# Patient Record
Sex: FEMALE | Race: WHITE | NOT HISPANIC OR LATINO | Employment: OTHER | URBAN - METROPOLITAN AREA
[De-identification: names, ages, dates, MRNs, and addresses within clinical notes are randomized per-mention and may not be internally consistent; named-entity substitution may affect disease eponyms.]

---

## 2017-07-18 ENCOUNTER — APPOINTMENT (OUTPATIENT)
Dept: GENERAL RADIOLOGY | Facility: HOSPITAL | Age: 67
End: 2017-07-18

## 2017-07-18 ENCOUNTER — HOSPITAL ENCOUNTER (EMERGENCY)
Facility: HOSPITAL | Age: 67
Discharge: HOME | End: 2017-07-18
Attending: EMERGENCY MEDICINE | Admitting: EMERGENCY MEDICINE

## 2017-07-18 VITALS
TEMPERATURE: 97.5 F | DIASTOLIC BLOOD PRESSURE: 79 MMHG | BODY MASS INDEX: 38.64 KG/M2 | RESPIRATION RATE: 18 BRPM | HEART RATE: 78 BPM | OXYGEN SATURATION: 93 % | HEIGHT: 62 IN | WEIGHT: 210 LBS | SYSTOLIC BLOOD PRESSURE: 139 MMHG

## 2017-07-18 DIAGNOSIS — S52.691A OTHER CLOSED FRACTURE OF DISTAL END OF RIGHT ULNA, INITIAL ENCOUNTER: ICD-10-CM

## 2017-07-18 DIAGNOSIS — S52.501A CLOSED FRACTURE OF DISTAL END OF RIGHT RADIUS, UNSPECIFIED FRACTURE MORPHOLOGY, INITIAL ENCOUNTER: Primary | ICD-10-CM

## 2017-07-18 PROCEDURE — 99284 EMERGENCY DEPT VISIT MOD MDM: CPT

## 2017-07-18 PROCEDURE — 73090 X-RAY EXAM OF FOREARM: CPT

## 2017-07-18 PROCEDURE — 73100 X-RAY EXAM OF WRIST: CPT

## 2017-07-18 PROCEDURE — 73110 X-RAY EXAM OF WRIST: CPT

## 2017-07-18 RX ORDER — OXYCODONE HYDROCHLORIDE AND ACETAMINOPHEN 5; 325 MG/1; MG/1
1 TABLET ORAL ONCE
Status: COMPLETED | OUTPATIENT
Start: 2017-07-18 | End: 2017-07-18

## 2017-07-18 RX ORDER — BUPIVACAINE HYDROCHLORIDE 5 MG/ML
10 INJECTION, SOLUTION EPIDURAL; INTRACAUDAL ONCE
Status: COMPLETED | OUTPATIENT
Start: 2017-07-18 | End: 2017-07-18

## 2017-07-18 RX ORDER — ONDANSETRON 4 MG/1
4 TABLET, ORALLY DISINTEGRATING ORAL EVERY 8 HOURS PRN
Qty: 15 TABLET | Refills: 0 | Status: SHIPPED | OUTPATIENT
Start: 2017-07-18 | End: 2022-10-24

## 2017-07-18 RX ORDER — OXYCODONE HYDROCHLORIDE AND ACETAMINOPHEN 5; 325 MG/1; MG/1
1 TABLET ORAL EVERY 6 HOURS PRN
Qty: 20 TABLET | Refills: 0 | Status: SHIPPED | OUTPATIENT
Start: 2017-07-18 | End: 2018-05-14

## 2017-07-18 RX ORDER — TIZANIDINE 4 MG/1
4 TABLET ORAL NIGHTLY PRN
COMMUNITY
End: 2018-05-14

## 2017-07-18 RX ORDER — ONDANSETRON 4 MG/1
4 TABLET, ORALLY DISINTEGRATING ORAL ONCE
Status: COMPLETED | OUTPATIENT
Start: 2017-07-18 | End: 2017-07-18

## 2017-07-18 RX ADMIN — ONDANSETRON 4 MG: 4 TABLET, ORALLY DISINTEGRATING ORAL at 21:01

## 2017-07-18 RX ADMIN — BUPIVACAINE HYDROCHLORIDE 10 ML: 5 INJECTION, SOLUTION EPIDURAL; INTRACAUDAL; PERINEURAL at 20:48

## 2017-07-18 RX ADMIN — OXYCODONE HYDROCHLORIDE AND ACETAMINOPHEN 1 TABLET: 5; 325 TABLET ORAL at 21:01

## 2018-05-07 ENCOUNTER — TELEPHONE (OUTPATIENT)
Dept: FAMILY MEDICINE CLINIC | Facility: CLINIC | Age: 68
End: 2018-05-07

## 2018-05-14 ENCOUNTER — OFFICE VISIT (OUTPATIENT)
Dept: FAMILY MEDICINE CLINIC | Facility: CLINIC | Age: 68
End: 2018-05-14

## 2018-05-14 VITALS
SYSTOLIC BLOOD PRESSURE: 122 MMHG | WEIGHT: 225 LBS | OXYGEN SATURATION: 99 % | BODY MASS INDEX: 41.41 KG/M2 | DIASTOLIC BLOOD PRESSURE: 84 MMHG | HEIGHT: 62 IN | HEART RATE: 90 BPM | TEMPERATURE: 98.2 F

## 2018-05-14 DIAGNOSIS — E03.8 SUBCLINICAL HYPOTHYROIDISM: Primary | ICD-10-CM

## 2018-05-14 DIAGNOSIS — E78.2 MIXED HYPERLIPIDEMIA: ICD-10-CM

## 2018-05-14 PROCEDURE — 99213 OFFICE O/P EST LOW 20 MIN: CPT | Performed by: INTERNAL MEDICINE

## 2018-05-14 RX ORDER — PREDNISONE 1 MG/1
1 TABLET ORAL DAILY
COMMUNITY

## 2018-07-13 ENCOUNTER — RESULTS ENCOUNTER (OUTPATIENT)
Dept: FAMILY MEDICINE CLINIC | Facility: CLINIC | Age: 68
End: 2018-07-13

## 2018-07-13 DIAGNOSIS — E78.2 MIXED HYPERLIPIDEMIA: ICD-10-CM

## 2018-07-13 DIAGNOSIS — E03.8 SUBCLINICAL HYPOTHYROIDISM: ICD-10-CM

## 2018-07-18 LAB
CHOLEST SERPL-MCNC: 165 MG/DL (ref 100–199)
HDLC SERPL-MCNC: 61 MG/DL
LDLC SERPL CALC-MCNC: 83 MG/DL (ref 0–99)
T3FREE SERPL-MCNC: 4 PG/ML (ref 2–4.4)
T4 FREE SERPL-MCNC: 1.55 NG/DL (ref 0.82–1.77)
TRIGL SERPL-MCNC: 107 MG/DL (ref 0–149)
TSH SERPL DL<=0.005 MIU/L-ACNC: 4.7 UIU/ML (ref 0.45–4.5)
VLDLC SERPL CALC-MCNC: 21 MG/DL (ref 5–40)

## 2018-10-18 ENCOUNTER — OFFICE VISIT (OUTPATIENT)
Dept: FAMILY MEDICINE CLINIC | Facility: CLINIC | Age: 68
End: 2018-10-18

## 2018-10-18 VITALS
SYSTOLIC BLOOD PRESSURE: 124 MMHG | WEIGHT: 223 LBS | TEMPERATURE: 97.9 F | HEART RATE: 76 BPM | DIASTOLIC BLOOD PRESSURE: 80 MMHG | BODY MASS INDEX: 41.04 KG/M2 | RESPIRATION RATE: 15 BRPM | HEIGHT: 62 IN

## 2018-10-18 DIAGNOSIS — I89.0 LYMPHEDEMA: Primary | ICD-10-CM

## 2018-10-18 DIAGNOSIS — E03.8 SUBCLINICAL HYPOTHYROIDISM: ICD-10-CM

## 2018-10-18 PROBLEM — G40.309 NONINTRACTABLE GENERALIZED IDIOPATHIC EPILEPSY WITHOUT STATUS EPILEPTICUS (HCC): Status: ACTIVE | Noted: 2018-10-18

## 2018-10-18 PROCEDURE — 99213 OFFICE O/P EST LOW 20 MIN: CPT | Performed by: INTERNAL MEDICINE

## 2018-10-18 RX ORDER — CLONAZEPAM 0.5 MG/1
.25-.5 TABLET ORAL
COMMUNITY
Start: 2018-10-04

## 2018-10-19 DIAGNOSIS — M06.9 RHEUMATOID ARTHRITIS INVOLVING MULTIPLE SITES, UNSPECIFIED RHEUMATOID FACTOR PRESENCE: ICD-10-CM

## 2018-10-19 DIAGNOSIS — M19.90 INFLAMMATORY ARTHROPATHY: ICD-10-CM

## 2018-10-19 DIAGNOSIS — M05.849: Primary | ICD-10-CM

## 2018-10-19 LAB
T3FREE SERPL-MCNC: 3.6 PG/ML (ref 2–4.4)
T4 FREE SERPL-MCNC: 1.62 NG/DL (ref 0.93–1.7)
TSH SERPL DL<=0.005 MIU/L-ACNC: 3.43 MIU/ML (ref 0.27–4.2)

## 2018-11-14 ENCOUNTER — HOSPITAL ENCOUNTER (OUTPATIENT)
Dept: PHYSICAL THERAPY | Facility: HOSPITAL | Age: 68
Setting detail: THERAPIES SERIES
Discharge: HOME | End: 2018-11-14

## 2018-11-14 DIAGNOSIS — I89.0 LYMPHEDEMA: Primary | ICD-10-CM

## 2018-11-14 PROCEDURE — G8978 MOBILITY CURRENT STATUS: HCPCS

## 2018-11-14 PROCEDURE — 97162 PT EVAL MOD COMPLEX 30 MIN: CPT

## 2018-11-14 PROCEDURE — G8979 MOBILITY GOAL STATUS: HCPCS

## 2018-12-17 ENCOUNTER — HOSPITAL ENCOUNTER (OUTPATIENT)
Dept: PHYSICAL THERAPY | Facility: HOSPITAL | Age: 68
Setting detail: THERAPIES SERIES
Discharge: HOME | End: 2018-12-17

## 2018-12-17 DIAGNOSIS — I89.0 LYMPHEDEMA: Primary | ICD-10-CM

## 2018-12-17 PROCEDURE — 97140 MANUAL THERAPY 1/> REGIONS: CPT

## 2018-12-18 ENCOUNTER — HOSPITAL ENCOUNTER (OUTPATIENT)
Dept: PHYSICAL THERAPY | Facility: HOSPITAL | Age: 68
Setting detail: THERAPIES SERIES
Discharge: HOME | End: 2018-12-18

## 2018-12-18 DIAGNOSIS — I89.0 LYMPHEDEMA: Primary | ICD-10-CM

## 2018-12-18 PROCEDURE — 97140 MANUAL THERAPY 1/> REGIONS: CPT

## 2018-12-19 ENCOUNTER — HOSPITAL ENCOUNTER (OUTPATIENT)
Dept: PHYSICAL THERAPY | Facility: HOSPITAL | Age: 68
Setting detail: THERAPIES SERIES
Discharge: HOME | End: 2018-12-19

## 2018-12-19 DIAGNOSIS — I89.0 LYMPHEDEMA: Primary | ICD-10-CM

## 2018-12-19 PROCEDURE — 97140 MANUAL THERAPY 1/> REGIONS: CPT

## 2018-12-20 ENCOUNTER — HOSPITAL ENCOUNTER (OUTPATIENT)
Dept: PHYSICAL THERAPY | Facility: HOSPITAL | Age: 68
Setting detail: THERAPIES SERIES
Discharge: HOME | End: 2018-12-20

## 2018-12-20 DIAGNOSIS — I89.0 LYMPHEDEMA: Primary | ICD-10-CM

## 2018-12-20 PROCEDURE — 97140 MANUAL THERAPY 1/> REGIONS: CPT

## 2018-12-21 ENCOUNTER — HOSPITAL ENCOUNTER (OUTPATIENT)
Dept: PHYSICAL THERAPY | Facility: HOSPITAL | Age: 68
Setting detail: THERAPIES SERIES
Discharge: HOME | End: 2018-12-21

## 2018-12-21 DIAGNOSIS — I89.0 LYMPHEDEMA: Primary | ICD-10-CM

## 2018-12-21 PROCEDURE — 97140 MANUAL THERAPY 1/> REGIONS: CPT

## 2018-12-24 ENCOUNTER — HOSPITAL ENCOUNTER (OUTPATIENT)
Dept: PHYSICAL THERAPY | Facility: HOSPITAL | Age: 68
Setting detail: THERAPIES SERIES
Discharge: HOME | End: 2018-12-24

## 2018-12-24 DIAGNOSIS — I89.0 LYMPHEDEMA: Primary | ICD-10-CM

## 2018-12-24 PROCEDURE — 97140 MANUAL THERAPY 1/> REGIONS: CPT

## 2018-12-27 ENCOUNTER — HOSPITAL ENCOUNTER (OUTPATIENT)
Dept: PHYSICAL THERAPY | Facility: HOSPITAL | Age: 68
Setting detail: THERAPIES SERIES
Discharge: HOME | End: 2018-12-27

## 2018-12-27 DIAGNOSIS — I89.0 LYMPHEDEMA: Primary | ICD-10-CM

## 2018-12-27 PROCEDURE — 97140 MANUAL THERAPY 1/> REGIONS: CPT

## 2018-12-28 ENCOUNTER — HOSPITAL ENCOUNTER (OUTPATIENT)
Dept: PHYSICAL THERAPY | Facility: HOSPITAL | Age: 68
Setting detail: THERAPIES SERIES
Discharge: HOME | End: 2018-12-28

## 2018-12-28 DIAGNOSIS — I89.0 LYMPHEDEMA: Primary | ICD-10-CM

## 2018-12-28 PROCEDURE — 97140 MANUAL THERAPY 1/> REGIONS: CPT

## 2018-12-31 ENCOUNTER — HOSPITAL ENCOUNTER (OUTPATIENT)
Dept: PHYSICAL THERAPY | Facility: HOSPITAL | Age: 68
Setting detail: THERAPIES SERIES
Discharge: HOME | End: 2018-12-31

## 2018-12-31 DIAGNOSIS — I89.0 LYMPHEDEMA: Primary | ICD-10-CM

## 2018-12-31 PROCEDURE — G8978 MOBILITY CURRENT STATUS: HCPCS

## 2018-12-31 PROCEDURE — G8979 MOBILITY GOAL STATUS: HCPCS

## 2018-12-31 PROCEDURE — 97140 MANUAL THERAPY 1/> REGIONS: CPT

## 2019-01-02 ENCOUNTER — HOSPITAL ENCOUNTER (OUTPATIENT)
Dept: PHYSICAL THERAPY | Facility: HOSPITAL | Age: 69
Setting detail: THERAPIES SERIES
Discharge: HOME | End: 2019-01-02

## 2019-01-02 DIAGNOSIS — I89.0 LYMPHEDEMA: Primary | ICD-10-CM

## 2019-01-02 PROCEDURE — 97140 MANUAL THERAPY 1/> REGIONS: CPT

## 2019-01-03 ENCOUNTER — APPOINTMENT (OUTPATIENT)
Dept: PHYSICAL THERAPY | Facility: HOSPITAL | Age: 69
End: 2019-01-03

## 2019-01-04 ENCOUNTER — HOSPITAL ENCOUNTER (OUTPATIENT)
Dept: PHYSICAL THERAPY | Facility: HOSPITAL | Age: 69
Setting detail: THERAPIES SERIES
Discharge: HOME | End: 2019-01-04

## 2019-01-04 DIAGNOSIS — I89.0 LYMPHEDEMA: Primary | ICD-10-CM

## 2019-01-04 PROCEDURE — 97140 MANUAL THERAPY 1/> REGIONS: CPT

## 2019-01-07 ENCOUNTER — HOSPITAL ENCOUNTER (OUTPATIENT)
Dept: PHYSICAL THERAPY | Facility: HOSPITAL | Age: 69
Setting detail: THERAPIES SERIES
Discharge: HOME | End: 2019-01-07

## 2019-01-07 DIAGNOSIS — I89.0 LYMPHEDEMA: Primary | ICD-10-CM

## 2019-01-07 PROCEDURE — 97140 MANUAL THERAPY 1/> REGIONS: CPT

## 2019-01-08 ENCOUNTER — HOSPITAL ENCOUNTER (OUTPATIENT)
Dept: PHYSICAL THERAPY | Facility: HOSPITAL | Age: 69
Setting detail: THERAPIES SERIES
Discharge: HOME | End: 2019-01-08

## 2019-01-08 DIAGNOSIS — I89.0 LYMPHEDEMA: Primary | ICD-10-CM

## 2019-01-08 PROCEDURE — 97140 MANUAL THERAPY 1/> REGIONS: CPT

## 2019-01-09 ENCOUNTER — HOSPITAL ENCOUNTER (OUTPATIENT)
Dept: PHYSICAL THERAPY | Facility: HOSPITAL | Age: 69
Setting detail: THERAPIES SERIES
Discharge: HOME | End: 2019-01-09

## 2019-01-09 DIAGNOSIS — I89.0 LYMPHEDEMA: Primary | ICD-10-CM

## 2019-01-09 PROCEDURE — 97140 MANUAL THERAPY 1/> REGIONS: CPT

## 2019-01-10 ENCOUNTER — APPOINTMENT (OUTPATIENT)
Dept: PHYSICAL THERAPY | Facility: HOSPITAL | Age: 69
End: 2019-01-10

## 2019-01-11 ENCOUNTER — HOSPITAL ENCOUNTER (OUTPATIENT)
Dept: PHYSICAL THERAPY | Facility: HOSPITAL | Age: 69
Setting detail: THERAPIES SERIES
Discharge: HOME | End: 2019-01-11

## 2019-01-11 DIAGNOSIS — I89.0 LYMPHEDEMA: Primary | ICD-10-CM

## 2019-01-11 PROCEDURE — 97140 MANUAL THERAPY 1/> REGIONS: CPT

## 2019-01-14 ENCOUNTER — HOSPITAL ENCOUNTER (OUTPATIENT)
Dept: PHYSICAL THERAPY | Facility: HOSPITAL | Age: 69
Setting detail: THERAPIES SERIES
Discharge: HOME | End: 2019-01-14

## 2019-01-14 DIAGNOSIS — I89.0 LYMPHEDEMA: Primary | ICD-10-CM

## 2019-01-14 PROCEDURE — 97140 MANUAL THERAPY 1/> REGIONS: CPT

## 2019-01-15 ENCOUNTER — HOSPITAL ENCOUNTER (OUTPATIENT)
Dept: PHYSICAL THERAPY | Facility: HOSPITAL | Age: 69
Setting detail: THERAPIES SERIES
Discharge: HOME | End: 2019-01-15

## 2019-01-15 DIAGNOSIS — I89.0 LYMPHEDEMA: Primary | ICD-10-CM

## 2019-01-15 PROCEDURE — 97140 MANUAL THERAPY 1/> REGIONS: CPT

## 2019-01-16 ENCOUNTER — HOSPITAL ENCOUNTER (OUTPATIENT)
Dept: PHYSICAL THERAPY | Facility: HOSPITAL | Age: 69
Setting detail: THERAPIES SERIES
Discharge: HOME | End: 2019-01-16

## 2019-01-16 DIAGNOSIS — I89.0 LYMPHEDEMA: Primary | ICD-10-CM

## 2019-01-16 PROCEDURE — 97140 MANUAL THERAPY 1/> REGIONS: CPT

## 2019-01-17 ENCOUNTER — HOSPITAL ENCOUNTER (OUTPATIENT)
Dept: PHYSICAL THERAPY | Facility: HOSPITAL | Age: 69
Setting detail: THERAPIES SERIES
Discharge: HOME | End: 2019-01-17

## 2019-01-17 DIAGNOSIS — I89.0 LYMPHEDEMA: Primary | ICD-10-CM

## 2019-01-17 PROCEDURE — 97140 MANUAL THERAPY 1/> REGIONS: CPT

## 2019-01-18 ENCOUNTER — HOSPITAL ENCOUNTER (OUTPATIENT)
Dept: PHYSICAL THERAPY | Facility: HOSPITAL | Age: 69
Setting detail: THERAPIES SERIES
Discharge: HOME | End: 2019-01-18

## 2019-01-18 DIAGNOSIS — I89.0 LYMPHEDEMA: Primary | ICD-10-CM

## 2019-01-18 PROCEDURE — 97140 MANUAL THERAPY 1/> REGIONS: CPT

## 2019-01-21 ENCOUNTER — HOSPITAL ENCOUNTER (OUTPATIENT)
Dept: PHYSICAL THERAPY | Facility: HOSPITAL | Age: 69
Setting detail: THERAPIES SERIES
Discharge: HOME | End: 2019-01-21

## 2019-01-21 DIAGNOSIS — I89.0 LYMPHEDEMA: Primary | ICD-10-CM

## 2019-01-21 PROCEDURE — 97140 MANUAL THERAPY 1/> REGIONS: CPT

## 2019-01-22 ENCOUNTER — APPOINTMENT (OUTPATIENT)
Dept: PHYSICAL THERAPY | Facility: HOSPITAL | Age: 69
End: 2019-01-22

## 2019-01-23 ENCOUNTER — APPOINTMENT (OUTPATIENT)
Dept: PHYSICAL THERAPY | Facility: HOSPITAL | Age: 69
End: 2019-01-23

## 2019-01-24 ENCOUNTER — HOSPITAL ENCOUNTER (OUTPATIENT)
Dept: PHYSICAL THERAPY | Facility: HOSPITAL | Age: 69
Setting detail: THERAPIES SERIES
Discharge: HOME | End: 2019-01-24

## 2019-01-24 ENCOUNTER — APPOINTMENT (OUTPATIENT)
Dept: PHYSICAL THERAPY | Facility: HOSPITAL | Age: 69
End: 2019-01-24

## 2019-01-24 DIAGNOSIS — I89.0 LYMPHEDEMA: Primary | ICD-10-CM

## 2019-01-24 PROCEDURE — 97535 SELF CARE MNGMENT TRAINING: CPT

## 2019-01-24 PROCEDURE — 97140 MANUAL THERAPY 1/> REGIONS: CPT

## 2019-01-25 ENCOUNTER — HOSPITAL ENCOUNTER (OUTPATIENT)
Dept: PHYSICAL THERAPY | Facility: HOSPITAL | Age: 69
Setting detail: THERAPIES SERIES
Discharge: HOME | End: 2019-01-25

## 2019-01-25 DIAGNOSIS — I89.0 LYMPHEDEMA: Primary | ICD-10-CM

## 2019-01-25 PROCEDURE — 97535 SELF CARE MNGMENT TRAINING: CPT

## 2019-04-17 ENCOUNTER — OFFICE VISIT (OUTPATIENT)
Dept: FAMILY MEDICINE CLINIC | Facility: CLINIC | Age: 69
End: 2019-04-17

## 2019-04-17 VITALS
DIASTOLIC BLOOD PRESSURE: 78 MMHG | OXYGEN SATURATION: 98 % | SYSTOLIC BLOOD PRESSURE: 118 MMHG | HEART RATE: 79 BPM | HEIGHT: 62 IN | RESPIRATION RATE: 16 BRPM | BODY MASS INDEX: 40.3 KG/M2 | WEIGHT: 219 LBS | TEMPERATURE: 97.5 F

## 2019-04-17 DIAGNOSIS — M54.50 ACUTE MIDLINE LOW BACK PAIN WITHOUT SCIATICA: Primary | ICD-10-CM

## 2019-04-17 DIAGNOSIS — R29.898 LEG WEAKNESS, BILATERAL: ICD-10-CM

## 2019-04-17 DIAGNOSIS — R26.89 IMBALANCE: ICD-10-CM

## 2019-04-17 PROCEDURE — 99214 OFFICE O/P EST MOD 30 MIN: CPT | Performed by: INTERNAL MEDICINE

## 2019-04-17 RX ORDER — TRIAMCINOLONE ACETONIDE 1 MG/G
CREAM TOPICAL
COMMUNITY
End: 2019-12-19

## 2019-04-17 RX ORDER — CHOLECALCIFEROL (VITAMIN D3) 125 MCG
CAPSULE ORAL
COMMUNITY

## 2019-04-23 ENCOUNTER — TREATMENT (OUTPATIENT)
Dept: PHYSICAL THERAPY | Facility: CLINIC | Age: 69
End: 2019-04-23

## 2019-04-23 DIAGNOSIS — R53.1 DECREASED STRENGTH, ENDURANCE, AND MOBILITY: ICD-10-CM

## 2019-04-23 DIAGNOSIS — G89.29 CHRONIC MIDLINE LOW BACK PAIN WITHOUT SCIATICA: Primary | ICD-10-CM

## 2019-04-23 DIAGNOSIS — R29.898 LEG WEAKNESS, BILATERAL: ICD-10-CM

## 2019-04-23 DIAGNOSIS — M54.50 ACUTE MIDLINE LOW BACK PAIN WITHOUT SCIATICA: ICD-10-CM

## 2019-04-23 DIAGNOSIS — M54.50 CHRONIC MIDLINE LOW BACK PAIN WITHOUT SCIATICA: Primary | ICD-10-CM

## 2019-04-23 DIAGNOSIS — R26.2 DIFFICULTY WALKING: ICD-10-CM

## 2019-04-23 DIAGNOSIS — R68.89 DECREASED STRENGTH, ENDURANCE, AND MOBILITY: ICD-10-CM

## 2019-04-23 DIAGNOSIS — R26.81 DIFFICULTY STANDING: ICD-10-CM

## 2019-04-23 DIAGNOSIS — Z74.09 DECREASED STRENGTH, ENDURANCE, AND MOBILITY: ICD-10-CM

## 2019-04-23 PROCEDURE — 97161 PT EVAL LOW COMPLEX 20 MIN: CPT | Performed by: PHYSICAL THERAPIST

## 2019-04-23 PROCEDURE — 97530 THERAPEUTIC ACTIVITIES: CPT | Performed by: PHYSICAL THERAPIST

## 2019-04-23 PROCEDURE — 97110 THERAPEUTIC EXERCISES: CPT | Performed by: PHYSICAL THERAPIST

## 2019-04-30 ENCOUNTER — TREATMENT (OUTPATIENT)
Dept: PHYSICAL THERAPY | Facility: CLINIC | Age: 69
End: 2019-04-30

## 2019-04-30 DIAGNOSIS — R68.89 DECREASED STRENGTH, ENDURANCE, AND MOBILITY: ICD-10-CM

## 2019-04-30 DIAGNOSIS — R29.898 LEG WEAKNESS, BILATERAL: ICD-10-CM

## 2019-04-30 DIAGNOSIS — M54.50 CHRONIC MIDLINE LOW BACK PAIN WITHOUT SCIATICA: Primary | ICD-10-CM

## 2019-04-30 DIAGNOSIS — R26.81 DIFFICULTY STANDING: ICD-10-CM

## 2019-04-30 DIAGNOSIS — R26.2 DIFFICULTY WALKING: ICD-10-CM

## 2019-04-30 DIAGNOSIS — Z74.09 DECREASED STRENGTH, ENDURANCE, AND MOBILITY: ICD-10-CM

## 2019-04-30 DIAGNOSIS — G89.29 CHRONIC MIDLINE LOW BACK PAIN WITHOUT SCIATICA: Primary | ICD-10-CM

## 2019-04-30 DIAGNOSIS — R53.1 DECREASED STRENGTH, ENDURANCE, AND MOBILITY: ICD-10-CM

## 2019-04-30 PROCEDURE — 97110 THERAPEUTIC EXERCISES: CPT | Performed by: PHYSICAL THERAPIST

## 2019-05-02 ENCOUNTER — TREATMENT (OUTPATIENT)
Dept: PHYSICAL THERAPY | Facility: CLINIC | Age: 69
End: 2019-05-02

## 2019-05-02 DIAGNOSIS — R68.89 DECREASED STRENGTH, ENDURANCE, AND MOBILITY: ICD-10-CM

## 2019-05-02 DIAGNOSIS — R53.1 DECREASED STRENGTH, ENDURANCE, AND MOBILITY: ICD-10-CM

## 2019-05-02 DIAGNOSIS — R26.2 DIFFICULTY WALKING: ICD-10-CM

## 2019-05-02 DIAGNOSIS — R29.898 LEG WEAKNESS, BILATERAL: ICD-10-CM

## 2019-05-02 DIAGNOSIS — G89.29 CHRONIC MIDLINE LOW BACK PAIN WITHOUT SCIATICA: Primary | ICD-10-CM

## 2019-05-02 DIAGNOSIS — M54.50 CHRONIC MIDLINE LOW BACK PAIN WITHOUT SCIATICA: Primary | ICD-10-CM

## 2019-05-02 DIAGNOSIS — R26.81 DIFFICULTY STANDING: ICD-10-CM

## 2019-05-02 DIAGNOSIS — Z74.09 DECREASED STRENGTH, ENDURANCE, AND MOBILITY: ICD-10-CM

## 2019-05-02 PROCEDURE — 97110 THERAPEUTIC EXERCISES: CPT | Performed by: PHYSICAL THERAPIST

## 2019-05-02 PROCEDURE — 97112 NEUROMUSCULAR REEDUCATION: CPT | Performed by: PHYSICAL THERAPIST

## 2019-05-06 ENCOUNTER — HOSPITAL ENCOUNTER (OUTPATIENT)
Dept: MRI IMAGING | Facility: HOSPITAL | Age: 69
Discharge: HOME | End: 2019-05-06
Admitting: INTERNAL MEDICINE

## 2019-05-06 DIAGNOSIS — R26.89 IMBALANCE: ICD-10-CM

## 2019-05-06 DIAGNOSIS — M54.50 ACUTE MIDLINE LOW BACK PAIN WITHOUT SCIATICA: ICD-10-CM

## 2019-05-06 PROCEDURE — 72148 MRI LUMBAR SPINE W/O DYE: CPT

## 2019-05-07 ENCOUNTER — TREATMENT (OUTPATIENT)
Dept: PHYSICAL THERAPY | Facility: CLINIC | Age: 69
End: 2019-05-07

## 2019-05-07 DIAGNOSIS — R53.1 DECREASED STRENGTH, ENDURANCE, AND MOBILITY: ICD-10-CM

## 2019-05-07 DIAGNOSIS — R29.898 LEG WEAKNESS, BILATERAL: ICD-10-CM

## 2019-05-07 DIAGNOSIS — Z74.09 DECREASED STRENGTH, ENDURANCE, AND MOBILITY: ICD-10-CM

## 2019-05-07 DIAGNOSIS — R26.2 DIFFICULTY WALKING: ICD-10-CM

## 2019-05-07 DIAGNOSIS — G89.29 CHRONIC MIDLINE LOW BACK PAIN WITHOUT SCIATICA: Primary | ICD-10-CM

## 2019-05-07 DIAGNOSIS — R68.89 DECREASED STRENGTH, ENDURANCE, AND MOBILITY: ICD-10-CM

## 2019-05-07 DIAGNOSIS — R26.81 DIFFICULTY STANDING: ICD-10-CM

## 2019-05-07 DIAGNOSIS — M54.50 CHRONIC MIDLINE LOW BACK PAIN WITHOUT SCIATICA: Primary | ICD-10-CM

## 2019-05-07 PROCEDURE — 97110 THERAPEUTIC EXERCISES: CPT | Performed by: PHYSICAL THERAPIST

## 2019-05-07 PROCEDURE — 97112 NEUROMUSCULAR REEDUCATION: CPT | Performed by: PHYSICAL THERAPIST

## 2019-05-09 ENCOUNTER — TREATMENT (OUTPATIENT)
Dept: PHYSICAL THERAPY | Facility: CLINIC | Age: 69
End: 2019-05-09

## 2019-05-09 DIAGNOSIS — R93.7 ABNORMAL MRI, LUMBAR SPINE: Primary | ICD-10-CM

## 2019-05-09 DIAGNOSIS — R53.1 DECREASED STRENGTH, ENDURANCE, AND MOBILITY: ICD-10-CM

## 2019-05-09 DIAGNOSIS — R26.81 DIFFICULTY STANDING: ICD-10-CM

## 2019-05-09 DIAGNOSIS — M54.50 CHRONIC MIDLINE LOW BACK PAIN WITHOUT SCIATICA: Primary | ICD-10-CM

## 2019-05-09 DIAGNOSIS — M54.50 CHRONIC LOW BACK PAIN WITHOUT SCIATICA, UNSPECIFIED BACK PAIN LATERALITY: ICD-10-CM

## 2019-05-09 DIAGNOSIS — R26.2 DIFFICULTY WALKING: ICD-10-CM

## 2019-05-09 DIAGNOSIS — R68.89 DECREASED STRENGTH, ENDURANCE, AND MOBILITY: ICD-10-CM

## 2019-05-09 DIAGNOSIS — R29.898 BILATERAL LEG WEAKNESS: ICD-10-CM

## 2019-05-09 DIAGNOSIS — R29.898 LEG WEAKNESS, BILATERAL: ICD-10-CM

## 2019-05-09 DIAGNOSIS — Z74.09 DECREASED STRENGTH, ENDURANCE, AND MOBILITY: ICD-10-CM

## 2019-05-09 DIAGNOSIS — G89.29 CHRONIC LOW BACK PAIN WITHOUT SCIATICA, UNSPECIFIED BACK PAIN LATERALITY: ICD-10-CM

## 2019-05-09 DIAGNOSIS — G89.29 CHRONIC MIDLINE LOW BACK PAIN WITHOUT SCIATICA: Primary | ICD-10-CM

## 2019-05-09 PROCEDURE — 97110 THERAPEUTIC EXERCISES: CPT | Performed by: PHYSICAL THERAPIST

## 2019-05-09 PROCEDURE — 97112 NEUROMUSCULAR REEDUCATION: CPT | Performed by: PHYSICAL THERAPIST

## 2019-05-30 ENCOUNTER — DOCUMENTATION (OUTPATIENT)
Dept: PHYSICAL THERAPY | Facility: HOSPITAL | Age: 69
End: 2019-05-30

## 2019-05-30 DIAGNOSIS — I89.0 LYMPHEDEMA: Primary | ICD-10-CM

## 2019-05-31 ENCOUNTER — OFFICE VISIT (OUTPATIENT)
Dept: NEUROSURGERY | Facility: CLINIC | Age: 69
End: 2019-05-31

## 2019-05-31 ENCOUNTER — TELEPHONE (OUTPATIENT)
Dept: FAMILY MEDICINE CLINIC | Facility: CLINIC | Age: 69
End: 2019-05-31

## 2019-05-31 VITALS
BODY MASS INDEX: 41.96 KG/M2 | WEIGHT: 228 LBS | RESPIRATION RATE: 16 BRPM | SYSTOLIC BLOOD PRESSURE: 134 MMHG | DIASTOLIC BLOOD PRESSURE: 72 MMHG | HEART RATE: 80 BPM | HEIGHT: 62 IN

## 2019-05-31 DIAGNOSIS — G89.29 CHRONIC BILATERAL LOW BACK PAIN WITHOUT SCIATICA: Primary | ICD-10-CM

## 2019-05-31 DIAGNOSIS — M54.50 CHRONIC BILATERAL LOW BACK PAIN WITHOUT SCIATICA: Primary | ICD-10-CM

## 2019-05-31 DIAGNOSIS — M81.0 OSTEOPOROSIS, UNSPECIFIED OSTEOPOROSIS TYPE, UNSPECIFIED PATHOLOGICAL FRACTURE PRESENCE: ICD-10-CM

## 2019-05-31 DIAGNOSIS — E66.01 MORBID OBESITY WITH BMI OF 40.0-44.9, ADULT (HCC): ICD-10-CM

## 2019-05-31 DIAGNOSIS — G40.309 NONINTRACTABLE GENERALIZED IDIOPATHIC EPILEPSY WITHOUT STATUS EPILEPTICUS (HCC): ICD-10-CM

## 2019-05-31 DIAGNOSIS — M51.9 LUMBAR DISC DISEASE: ICD-10-CM

## 2019-05-31 PROCEDURE — 99203 OFFICE O/P NEW LOW 30 MIN: CPT | Performed by: NEUROLOGICAL SURGERY

## 2019-05-31 RX ORDER — LAMOTRIGINE 100 MG/1
100 TABLET ORAL DAILY
COMMUNITY
End: 2022-10-21

## 2019-05-31 RX ORDER — OXYCODONE HYDROCHLORIDE AND ACETAMINOPHEN 5; 325 MG/1; MG/1
1 TABLET ORAL EVERY 6 HOURS PRN
COMMUNITY
End: 2019-12-19

## 2019-07-01 ENCOUNTER — DOCUMENTATION (OUTPATIENT)
Dept: PHYSICAL THERAPY | Facility: CLINIC | Age: 69
End: 2019-07-01

## 2019-07-25 ENCOUNTER — TRANSCRIBE ORDERS (OUTPATIENT)
Dept: PHYSICAL THERAPY | Facility: CLINIC | Age: 69
End: 2019-07-25

## 2019-07-25 ENCOUNTER — TREATMENT (OUTPATIENT)
Dept: PHYSICAL THERAPY | Facility: CLINIC | Age: 69
End: 2019-07-25

## 2019-07-25 DIAGNOSIS — R27.0 ATAXIA: ICD-10-CM

## 2019-07-25 DIAGNOSIS — R29.6 FREQUENT FALLS: ICD-10-CM

## 2019-07-25 DIAGNOSIS — R27.0 ATAXIA: Primary | ICD-10-CM

## 2019-07-25 DIAGNOSIS — R26.2 DIFFICULTY WALKING: Primary | ICD-10-CM

## 2019-07-25 DIAGNOSIS — Z74.09 DECREASED STRENGTH, ENDURANCE, AND MOBILITY: ICD-10-CM

## 2019-07-25 DIAGNOSIS — R29.898 LEG WEAKNESS, BILATERAL: ICD-10-CM

## 2019-07-25 DIAGNOSIS — R68.89 DECREASED STRENGTH, ENDURANCE, AND MOBILITY: ICD-10-CM

## 2019-07-25 DIAGNOSIS — R53.1 DECREASED STRENGTH, ENDURANCE, AND MOBILITY: ICD-10-CM

## 2019-07-25 DIAGNOSIS — R26.81 DIFFICULTY STANDING: ICD-10-CM

## 2019-07-25 PROCEDURE — 97110 THERAPEUTIC EXERCISES: CPT | Performed by: PHYSICAL THERAPIST

## 2019-07-25 PROCEDURE — 97162 PT EVAL MOD COMPLEX 30 MIN: CPT | Performed by: PHYSICAL THERAPIST

## 2019-07-25 PROCEDURE — 97112 NEUROMUSCULAR REEDUCATION: CPT | Performed by: PHYSICAL THERAPIST

## 2019-07-30 ENCOUNTER — TREATMENT (OUTPATIENT)
Dept: PHYSICAL THERAPY | Facility: CLINIC | Age: 69
End: 2019-07-30

## 2019-07-30 DIAGNOSIS — R26.81 DIFFICULTY STANDING: ICD-10-CM

## 2019-07-30 DIAGNOSIS — R53.1 DECREASED STRENGTH, ENDURANCE, AND MOBILITY: ICD-10-CM

## 2019-07-30 DIAGNOSIS — R26.2 DIFFICULTY WALKING: Primary | ICD-10-CM

## 2019-07-30 DIAGNOSIS — Z74.09 DECREASED STRENGTH, ENDURANCE, AND MOBILITY: ICD-10-CM

## 2019-07-30 DIAGNOSIS — R29.898 LEG WEAKNESS, BILATERAL: ICD-10-CM

## 2019-07-30 DIAGNOSIS — R68.89 DECREASED STRENGTH, ENDURANCE, AND MOBILITY: ICD-10-CM

## 2019-07-30 DIAGNOSIS — R27.0 ATAXIA: ICD-10-CM

## 2019-07-30 PROCEDURE — 97110 THERAPEUTIC EXERCISES: CPT | Performed by: PHYSICAL THERAPIST

## 2019-08-01 ENCOUNTER — TREATMENT (OUTPATIENT)
Dept: PHYSICAL THERAPY | Facility: CLINIC | Age: 69
End: 2019-08-01

## 2019-08-01 DIAGNOSIS — Z74.09 DECREASED STRENGTH, ENDURANCE, AND MOBILITY: ICD-10-CM

## 2019-08-01 DIAGNOSIS — R26.2 DIFFICULTY WALKING: Primary | ICD-10-CM

## 2019-08-01 DIAGNOSIS — R68.89 DECREASED STRENGTH, ENDURANCE, AND MOBILITY: ICD-10-CM

## 2019-08-01 DIAGNOSIS — R26.81 DIFFICULTY STANDING: ICD-10-CM

## 2019-08-01 DIAGNOSIS — R29.898 LEG WEAKNESS, BILATERAL: ICD-10-CM

## 2019-08-01 DIAGNOSIS — R53.1 DECREASED STRENGTH, ENDURANCE, AND MOBILITY: ICD-10-CM

## 2019-08-01 PROCEDURE — 97110 THERAPEUTIC EXERCISES: CPT | Performed by: PHYSICAL THERAPIST

## 2019-08-06 ENCOUNTER — TREATMENT (OUTPATIENT)
Dept: PHYSICAL THERAPY | Facility: CLINIC | Age: 69
End: 2019-08-06

## 2019-08-06 DIAGNOSIS — R53.1 DECREASED STRENGTH, ENDURANCE, AND MOBILITY: ICD-10-CM

## 2019-08-06 DIAGNOSIS — R26.2 DIFFICULTY WALKING: Primary | ICD-10-CM

## 2019-08-06 DIAGNOSIS — Z74.09 DECREASED STRENGTH, ENDURANCE, AND MOBILITY: ICD-10-CM

## 2019-08-06 DIAGNOSIS — R68.89 DECREASED STRENGTH, ENDURANCE, AND MOBILITY: ICD-10-CM

## 2019-08-06 DIAGNOSIS — R26.81 DIFFICULTY STANDING: ICD-10-CM

## 2019-08-06 DIAGNOSIS — R29.898 LEG WEAKNESS, BILATERAL: ICD-10-CM

## 2019-08-06 PROCEDURE — 97110 THERAPEUTIC EXERCISES: CPT | Performed by: PHYSICAL THERAPIST

## 2019-08-08 ENCOUNTER — TREATMENT (OUTPATIENT)
Dept: PHYSICAL THERAPY | Facility: CLINIC | Age: 69
End: 2019-08-08

## 2019-08-08 DIAGNOSIS — R68.89 DECREASED STRENGTH, ENDURANCE, AND MOBILITY: ICD-10-CM

## 2019-08-08 DIAGNOSIS — Z74.09 DECREASED STRENGTH, ENDURANCE, AND MOBILITY: ICD-10-CM

## 2019-08-08 DIAGNOSIS — R53.1 DECREASED STRENGTH, ENDURANCE, AND MOBILITY: ICD-10-CM

## 2019-08-08 DIAGNOSIS — R29.898 LEG WEAKNESS, BILATERAL: ICD-10-CM

## 2019-08-08 DIAGNOSIS — R26.2 DIFFICULTY WALKING: Primary | ICD-10-CM

## 2019-08-08 DIAGNOSIS — R26.81 DIFFICULTY STANDING: ICD-10-CM

## 2019-08-08 PROCEDURE — 97110 THERAPEUTIC EXERCISES: CPT | Performed by: PHYSICAL THERAPIST

## 2019-08-15 ENCOUNTER — TREATMENT (OUTPATIENT)
Dept: PHYSICAL THERAPY | Facility: CLINIC | Age: 69
End: 2019-08-15

## 2019-08-15 DIAGNOSIS — Z74.09 DECREASED STRENGTH, ENDURANCE, AND MOBILITY: ICD-10-CM

## 2019-08-15 DIAGNOSIS — R26.81 DIFFICULTY STANDING: ICD-10-CM

## 2019-08-15 DIAGNOSIS — R26.2 DIFFICULTY WALKING: Primary | ICD-10-CM

## 2019-08-15 DIAGNOSIS — R68.89 DECREASED STRENGTH, ENDURANCE, AND MOBILITY: ICD-10-CM

## 2019-08-15 DIAGNOSIS — R53.1 DECREASED STRENGTH, ENDURANCE, AND MOBILITY: ICD-10-CM

## 2019-08-15 DIAGNOSIS — R29.898 LEG WEAKNESS, BILATERAL: ICD-10-CM

## 2019-08-15 PROCEDURE — 97110 THERAPEUTIC EXERCISES: CPT | Performed by: PHYSICAL THERAPIST

## 2019-08-20 ENCOUNTER — TREATMENT (OUTPATIENT)
Dept: PHYSICAL THERAPY | Facility: CLINIC | Age: 69
End: 2019-08-20

## 2019-08-20 DIAGNOSIS — R53.1 DECREASED STRENGTH, ENDURANCE, AND MOBILITY: ICD-10-CM

## 2019-08-20 DIAGNOSIS — Z74.09 DECREASED STRENGTH, ENDURANCE, AND MOBILITY: ICD-10-CM

## 2019-08-20 DIAGNOSIS — R26.2 DIFFICULTY WALKING: Primary | ICD-10-CM

## 2019-08-20 DIAGNOSIS — R29.898 LEG WEAKNESS, BILATERAL: ICD-10-CM

## 2019-08-20 DIAGNOSIS — R68.89 DECREASED STRENGTH, ENDURANCE, AND MOBILITY: ICD-10-CM

## 2019-08-20 DIAGNOSIS — R26.81 DIFFICULTY STANDING: ICD-10-CM

## 2019-08-20 PROCEDURE — 97112 NEUROMUSCULAR REEDUCATION: CPT | Performed by: PHYSICAL THERAPIST

## 2019-08-20 PROCEDURE — 97110 THERAPEUTIC EXERCISES: CPT | Performed by: PHYSICAL THERAPIST

## 2019-08-22 ENCOUNTER — TREATMENT (OUTPATIENT)
Dept: PHYSICAL THERAPY | Facility: CLINIC | Age: 69
End: 2019-08-22

## 2019-08-22 DIAGNOSIS — R29.898 LEG WEAKNESS, BILATERAL: ICD-10-CM

## 2019-08-22 DIAGNOSIS — R68.89 DECREASED STRENGTH, ENDURANCE, AND MOBILITY: ICD-10-CM

## 2019-08-22 DIAGNOSIS — R53.1 DECREASED STRENGTH, ENDURANCE, AND MOBILITY: ICD-10-CM

## 2019-08-22 DIAGNOSIS — R26.2 DIFFICULTY WALKING: Primary | ICD-10-CM

## 2019-08-22 DIAGNOSIS — Z74.09 DECREASED STRENGTH, ENDURANCE, AND MOBILITY: ICD-10-CM

## 2019-08-22 DIAGNOSIS — R26.81 DIFFICULTY STANDING: ICD-10-CM

## 2019-08-22 PROCEDURE — 97112 NEUROMUSCULAR REEDUCATION: CPT | Performed by: PHYSICAL THERAPIST

## 2019-08-22 PROCEDURE — 97110 THERAPEUTIC EXERCISES: CPT | Performed by: PHYSICAL THERAPIST

## 2019-08-27 ENCOUNTER — TREATMENT (OUTPATIENT)
Dept: PHYSICAL THERAPY | Facility: CLINIC | Age: 69
End: 2019-08-27

## 2019-08-27 DIAGNOSIS — R26.81 DIFFICULTY STANDING: ICD-10-CM

## 2019-08-27 DIAGNOSIS — R26.2 DIFFICULTY WALKING: Primary | ICD-10-CM

## 2019-08-27 DIAGNOSIS — Z74.09 DECREASED STRENGTH, ENDURANCE, AND MOBILITY: ICD-10-CM

## 2019-08-27 DIAGNOSIS — R53.1 DECREASED STRENGTH, ENDURANCE, AND MOBILITY: ICD-10-CM

## 2019-08-27 DIAGNOSIS — R68.89 DECREASED STRENGTH, ENDURANCE, AND MOBILITY: ICD-10-CM

## 2019-08-27 DIAGNOSIS — R29.898 LEG WEAKNESS, BILATERAL: ICD-10-CM

## 2019-08-27 PROCEDURE — 97112 NEUROMUSCULAR REEDUCATION: CPT | Performed by: PHYSICAL THERAPIST

## 2019-08-27 PROCEDURE — 97110 THERAPEUTIC EXERCISES: CPT | Performed by: PHYSICAL THERAPIST

## 2019-08-29 ENCOUNTER — TREATMENT (OUTPATIENT)
Dept: PHYSICAL THERAPY | Facility: CLINIC | Age: 69
End: 2019-08-29

## 2019-08-29 DIAGNOSIS — R68.89 DECREASED STRENGTH, ENDURANCE, AND MOBILITY: ICD-10-CM

## 2019-08-29 DIAGNOSIS — Z74.09 DECREASED STRENGTH, ENDURANCE, AND MOBILITY: ICD-10-CM

## 2019-08-29 DIAGNOSIS — R26.2 DIFFICULTY WALKING: Primary | ICD-10-CM

## 2019-08-29 DIAGNOSIS — R26.81 DIFFICULTY STANDING: ICD-10-CM

## 2019-08-29 DIAGNOSIS — R29.898 LEG WEAKNESS, BILATERAL: ICD-10-CM

## 2019-08-29 DIAGNOSIS — R53.1 DECREASED STRENGTH, ENDURANCE, AND MOBILITY: ICD-10-CM

## 2019-08-29 PROCEDURE — 97112 NEUROMUSCULAR REEDUCATION: CPT | Performed by: PHYSICAL THERAPIST

## 2019-08-29 PROCEDURE — 97110 THERAPEUTIC EXERCISES: CPT | Performed by: PHYSICAL THERAPIST

## 2019-09-04 ENCOUNTER — OFFICE VISIT (OUTPATIENT)
Dept: PHYSICAL THERAPY | Facility: CLINIC | Age: 69
End: 2019-09-04

## 2019-09-04 DIAGNOSIS — R29.898 LEG WEAKNESS, BILATERAL: ICD-10-CM

## 2019-09-04 DIAGNOSIS — R53.1 DECREASED STRENGTH, ENDURANCE, AND MOBILITY: ICD-10-CM

## 2019-09-04 DIAGNOSIS — R26.81 DIFFICULTY STANDING: ICD-10-CM

## 2019-09-04 DIAGNOSIS — Z74.09 DECREASED STRENGTH, ENDURANCE, AND MOBILITY: ICD-10-CM

## 2019-09-04 DIAGNOSIS — R68.89 DECREASED STRENGTH, ENDURANCE, AND MOBILITY: ICD-10-CM

## 2019-09-04 DIAGNOSIS — R26.2 DIFFICULTY WALKING: Primary | ICD-10-CM

## 2019-09-04 PROCEDURE — 97112 NEUROMUSCULAR REEDUCATION: CPT | Performed by: PHYSICAL THERAPIST

## 2019-09-04 PROCEDURE — 97110 THERAPEUTIC EXERCISES: CPT | Performed by: PHYSICAL THERAPIST

## 2019-09-05 ENCOUNTER — TREATMENT (OUTPATIENT)
Dept: PHYSICAL THERAPY | Facility: CLINIC | Age: 69
End: 2019-09-05

## 2019-09-05 DIAGNOSIS — R26.2 DIFFICULTY WALKING: Primary | ICD-10-CM

## 2019-09-05 DIAGNOSIS — R29.898 LEG WEAKNESS, BILATERAL: ICD-10-CM

## 2019-09-05 DIAGNOSIS — R26.81 DIFFICULTY STANDING: ICD-10-CM

## 2019-09-05 DIAGNOSIS — Z74.09 DECREASED STRENGTH, ENDURANCE, AND MOBILITY: ICD-10-CM

## 2019-09-05 DIAGNOSIS — R53.1 DECREASED STRENGTH, ENDURANCE, AND MOBILITY: ICD-10-CM

## 2019-09-05 DIAGNOSIS — R68.89 DECREASED STRENGTH, ENDURANCE, AND MOBILITY: ICD-10-CM

## 2019-09-05 PROCEDURE — 97110 THERAPEUTIC EXERCISES: CPT | Performed by: PHYSICAL THERAPIST

## 2019-09-05 PROCEDURE — 97112 NEUROMUSCULAR REEDUCATION: CPT | Performed by: PHYSICAL THERAPIST

## 2019-11-12 ENCOUNTER — OFFICE VISIT (OUTPATIENT)
Dept: FAMILY MEDICINE CLINIC | Facility: CLINIC | Age: 69
End: 2019-11-12

## 2019-11-12 VITALS
HEIGHT: 62 IN | OXYGEN SATURATION: 99 % | TEMPERATURE: 97.5 F | HEART RATE: 112 BPM | DIASTOLIC BLOOD PRESSURE: 70 MMHG | WEIGHT: 218 LBS | RESPIRATION RATE: 16 BRPM | SYSTOLIC BLOOD PRESSURE: 102 MMHG | BODY MASS INDEX: 40.12 KG/M2

## 2019-11-12 DIAGNOSIS — R82.81 BACTERIURIA WITH PYURIA: ICD-10-CM

## 2019-11-12 DIAGNOSIS — R82.71 BACTERIURIA WITH PYURIA: ICD-10-CM

## 2019-11-12 DIAGNOSIS — Z79.899 HIGH RISK MEDICATION USE: Primary | ICD-10-CM

## 2019-11-12 DIAGNOSIS — G40.309 NONINTRACTABLE GENERALIZED IDIOPATHIC EPILEPSY WITHOUT STATUS EPILEPTICUS (HCC): ICD-10-CM

## 2019-11-12 LAB
BILIRUB BLD-MCNC: NEGATIVE MG/DL
CLARITY, POC: CLEAR
COLOR UR: YELLOW
GLUCOSE UR STRIP-MCNC: NEGATIVE MG/DL
KETONES UR QL: NEGATIVE
LEUKOCYTE EST, POC: ABNORMAL
NITRITE UR-MCNC: NEGATIVE MG/ML
PH UR: 6.5 [PH] (ref 5–8)
PROT UR STRIP-MCNC: ABNORMAL MG/DL
RBC # UR STRIP: NEGATIVE /UL
SP GR UR: 1.03 (ref 1–1.03)
UROBILINOGEN UR QL: NORMAL

## 2019-11-12 PROCEDURE — 99213 OFFICE O/P EST LOW 20 MIN: CPT | Performed by: FAMILY MEDICINE

## 2019-11-12 PROCEDURE — 81003 URINALYSIS AUTO W/O SCOPE: CPT | Performed by: FAMILY MEDICINE

## 2019-11-15 LAB
ALBUMIN SERPL-MCNC: 4.1 G/DL (ref 3.5–5.2)
ALBUMIN/GLOB SERPL: 1.3 G/DL
ALP SERPL-CCNC: 73 U/L (ref 39–117)
ALT SERPL-CCNC: 31 U/L (ref 1–33)
AST SERPL-CCNC: 23 U/L (ref 1–32)
BACTERIA UR CULT: ABNORMAL
BACTERIA UR CULT: ABNORMAL
BASOPHILS # BLD AUTO: 0.03 10*3/MM3 (ref 0–0.2)
BASOPHILS NFR BLD AUTO: 0.3 % (ref 0–1.5)
BILIRUB SERPL-MCNC: <0.2 MG/DL (ref 0.2–1.2)
BUN SERPL-MCNC: 10 MG/DL (ref 8–23)
BUN/CREAT SERPL: 11.8 (ref 7–25)
CALCIUM SERPL-MCNC: 9.1 MG/DL (ref 8.6–10.5)
CHLORIDE SERPL-SCNC: 105 MMOL/L (ref 98–107)
CO2 SERPL-SCNC: 28.9 MMOL/L (ref 22–29)
CREAT SERPL-MCNC: 0.85 MG/DL (ref 0.57–1)
EOSINOPHIL # BLD AUTO: 0.11 10*3/MM3 (ref 0–0.4)
EOSINOPHIL NFR BLD AUTO: 1.1 % (ref 0.3–6.2)
ERYTHROCYTE [DISTWIDTH] IN BLOOD BY AUTOMATED COUNT: 15.7 % (ref 12.3–15.4)
GLOBULIN SER CALC-MCNC: 3.1 GM/DL
GLUCOSE SERPL-MCNC: 128 MG/DL (ref 65–99)
HCT VFR BLD AUTO: 36 % (ref 34–46.6)
HGB BLD-MCNC: 12.3 G/DL (ref 12–15.9)
IMM GRANULOCYTES # BLD AUTO: 0.04 10*3/MM3 (ref 0–0.05)
IMM GRANULOCYTES NFR BLD AUTO: 0.4 % (ref 0–0.5)
LYMPHOCYTES # BLD AUTO: 1.89 10*3/MM3 (ref 0.7–3.1)
LYMPHOCYTES NFR BLD AUTO: 19.7 % (ref 19.6–45.3)
MCH RBC QN AUTO: 32 PG (ref 26.6–33)
MCHC RBC AUTO-ENTMCNC: 34.2 G/DL (ref 31.5–35.7)
MCV RBC AUTO: 93.8 FL (ref 79–97)
MONOCYTES # BLD AUTO: 0.48 10*3/MM3 (ref 0.1–0.9)
MONOCYTES NFR BLD AUTO: 5 % (ref 5–12)
NEUTROPHILS # BLD AUTO: 7.05 10*3/MM3 (ref 1.7–7)
NEUTROPHILS NFR BLD AUTO: 73.5 % (ref 42.7–76)
NRBC BLD AUTO-RTO: 0 /100 WBC (ref 0–0.2)
OTHER ANTIBIOTIC SUSC ISLT: ABNORMAL
PLATELET # BLD AUTO: 261 10*3/MM3 (ref 140–450)
POTASSIUM SERPL-SCNC: 4.6 MMOL/L (ref 3.5–5.2)
PROT SERPL-MCNC: 7.2 G/DL (ref 6–8.5)
RBC # BLD AUTO: 3.84 10*6/MM3 (ref 3.77–5.28)
SODIUM SERPL-SCNC: 144 MMOL/L (ref 136–145)
WBC # BLD AUTO: 9.6 10*3/MM3 (ref 3.4–10.8)

## 2019-11-15 RX ORDER — CEPHALEXIN 500 MG/1
500 CAPSULE ORAL 3 TIMES DAILY
Qty: 15 CAPSULE | Refills: 0 | Status: SHIPPED | OUTPATIENT
Start: 2019-11-15 | End: 2019-12-09

## 2019-11-30 ENCOUNTER — APPOINTMENT (OUTPATIENT)
Dept: GENERAL RADIOLOGY | Facility: HOSPITAL | Age: 69
End: 2019-11-30

## 2019-11-30 ENCOUNTER — HOSPITAL ENCOUNTER (EMERGENCY)
Facility: HOSPITAL | Age: 69
Discharge: HOME | End: 2019-11-30
Attending: EMERGENCY MEDICINE | Admitting: EMERGENCY MEDICINE

## 2019-11-30 VITALS
WEIGHT: 215 LBS | SYSTOLIC BLOOD PRESSURE: 125 MMHG | HEART RATE: 69 BPM | BODY MASS INDEX: 39.56 KG/M2 | TEMPERATURE: 97.8 F | HEIGHT: 62 IN | OXYGEN SATURATION: 95 % | DIASTOLIC BLOOD PRESSURE: 79 MMHG | RESPIRATION RATE: 16 BRPM

## 2019-11-30 DIAGNOSIS — M54.50 ACUTE MIDLINE LOW BACK PAIN WITHOUT SCIATICA: ICD-10-CM

## 2019-11-30 DIAGNOSIS — M47.816 ARTHRITIS OF LUMBAR SPINE: Primary | ICD-10-CM

## 2019-11-30 LAB
ANION GAP SERPL CALCULATED.3IONS-SCNC: 11.4 MMOL/L (ref 5–15)
BASOPHILS # BLD AUTO: 0.02 10*3/MM3 (ref 0–0.2)
BASOPHILS NFR BLD AUTO: 0.2 % (ref 0–1.5)
BILIRUB UR QL STRIP: NEGATIVE
BUN BLD-MCNC: 20 MG/DL (ref 8–23)
BUN/CREAT SERPL: 28.2 (ref 7–25)
CALCIUM SPEC-SCNC: 9.5 MG/DL (ref 8.6–10.5)
CHLORIDE SERPL-SCNC: 106 MMOL/L (ref 98–107)
CLARITY UR: CLEAR
CO2 SERPL-SCNC: 25.6 MMOL/L (ref 22–29)
COLOR UR: YELLOW
CREAT BLD-MCNC: 0.71 MG/DL (ref 0.57–1)
DEPRECATED RDW RBC AUTO: 52.6 FL (ref 37–54)
EOSINOPHIL # BLD AUTO: 0.13 10*3/MM3 (ref 0–0.4)
EOSINOPHIL NFR BLD AUTO: 1.6 % (ref 0.3–6.2)
ERYTHROCYTE [DISTWIDTH] IN BLOOD BY AUTOMATED COUNT: 15.8 % (ref 12.3–15.4)
GFR SERPL CREATININE-BSD FRML MDRD: 81 ML/MIN/1.73
GLUCOSE BLD-MCNC: 104 MG/DL (ref 65–99)
GLUCOSE UR STRIP-MCNC: NEGATIVE MG/DL
HCT VFR BLD AUTO: 36.6 % (ref 34–46.6)
HGB BLD-MCNC: 12.1 G/DL (ref 12–15.9)
HGB UR QL STRIP.AUTO: NEGATIVE
IMM GRANULOCYTES # BLD AUTO: 0.03 10*3/MM3 (ref 0–0.05)
IMM GRANULOCYTES NFR BLD AUTO: 0.4 % (ref 0–0.5)
KETONES UR QL STRIP: NEGATIVE
LEUKOCYTE ESTERASE UR QL STRIP.AUTO: NEGATIVE
LYMPHOCYTES # BLD AUTO: 1.63 10*3/MM3 (ref 0.7–3.1)
LYMPHOCYTES NFR BLD AUTO: 20 % (ref 19.6–45.3)
MCH RBC QN AUTO: 31.3 PG (ref 26.6–33)
MCHC RBC AUTO-ENTMCNC: 33.1 G/DL (ref 31.5–35.7)
MCV RBC AUTO: 94.6 FL (ref 79–97)
MONOCYTES # BLD AUTO: 0.69 10*3/MM3 (ref 0.1–0.9)
MONOCYTES NFR BLD AUTO: 8.5 % (ref 5–12)
NEUTROPHILS # BLD AUTO: 5.66 10*3/MM3 (ref 1.7–7)
NEUTROPHILS NFR BLD AUTO: 69.3 % (ref 42.7–76)
NITRITE UR QL STRIP: NEGATIVE
NRBC BLD AUTO-RTO: 0 /100 WBC (ref 0–0.2)
PH UR STRIP.AUTO: 6 [PH] (ref 5–8)
PLATELET # BLD AUTO: 238 10*3/MM3 (ref 140–450)
PMV BLD AUTO: 10.1 FL (ref 6–12)
POTASSIUM BLD-SCNC: 4.5 MMOL/L (ref 3.5–5.2)
PROT UR QL STRIP: NEGATIVE
RBC # BLD AUTO: 3.87 10*6/MM3 (ref 3.77–5.28)
SODIUM BLD-SCNC: 143 MMOL/L (ref 136–145)
SP GR UR STRIP: 1.02 (ref 1–1.03)
UROBILINOGEN UR QL STRIP: NORMAL
WBC NRBC COR # BLD: 8.16 10*3/MM3 (ref 3.4–10.8)

## 2019-11-30 PROCEDURE — 85025 COMPLETE CBC W/AUTO DIFF WBC: CPT | Performed by: EMERGENCY MEDICINE

## 2019-11-30 PROCEDURE — 25010000002 HYDROMORPHONE PER 4 MG: Performed by: EMERGENCY MEDICINE

## 2019-11-30 PROCEDURE — 80048 BASIC METABOLIC PNL TOTAL CA: CPT | Performed by: EMERGENCY MEDICINE

## 2019-11-30 PROCEDURE — 81003 URINALYSIS AUTO W/O SCOPE: CPT | Performed by: EMERGENCY MEDICINE

## 2019-11-30 PROCEDURE — 72110 X-RAY EXAM L-2 SPINE 4/>VWS: CPT

## 2019-11-30 PROCEDURE — 96374 THER/PROPH/DIAG INJ IV PUSH: CPT

## 2019-11-30 PROCEDURE — 99284 EMERGENCY DEPT VISIT MOD MDM: CPT

## 2019-11-30 RX ORDER — HYDROMORPHONE HYDROCHLORIDE 1 MG/ML
0.5 INJECTION, SOLUTION INTRAMUSCULAR; INTRAVENOUS; SUBCUTANEOUS ONCE
Status: COMPLETED | OUTPATIENT
Start: 2019-11-30 | End: 2019-11-30

## 2019-11-30 RX ORDER — HYDROCODONE BITARTRATE AND ACETAMINOPHEN 5; 325 MG/1; MG/1
1 TABLET ORAL EVERY 6 HOURS PRN
Qty: 12 TABLET | Refills: 0 | Status: SHIPPED | OUTPATIENT
Start: 2019-11-30 | End: 2019-12-03

## 2019-11-30 RX ORDER — SODIUM CHLORIDE 0.9 % (FLUSH) 0.9 %
10 SYRINGE (ML) INJECTION AS NEEDED
Status: DISCONTINUED | OUTPATIENT
Start: 2019-11-30 | End: 2019-11-30 | Stop reason: HOSPADM

## 2019-11-30 RX ADMIN — HYDROMORPHONE HYDROCHLORIDE 0.5 MG: 1 INJECTION, SOLUTION INTRAMUSCULAR; INTRAVENOUS; SUBCUTANEOUS at 08:38

## 2019-12-09 ENCOUNTER — OFFICE VISIT (OUTPATIENT)
Dept: FAMILY MEDICINE CLINIC | Facility: CLINIC | Age: 69
End: 2019-12-09

## 2019-12-09 VITALS
DIASTOLIC BLOOD PRESSURE: 80 MMHG | HEIGHT: 62 IN | WEIGHT: 213 LBS | TEMPERATURE: 98.3 F | SYSTOLIC BLOOD PRESSURE: 140 MMHG | OXYGEN SATURATION: 97 % | HEART RATE: 106 BPM | BODY MASS INDEX: 39.2 KG/M2

## 2019-12-09 DIAGNOSIS — M54.50 LUMBAR BACK PAIN: Primary | ICD-10-CM

## 2019-12-09 PROCEDURE — 99213 OFFICE O/P EST LOW 20 MIN: CPT | Performed by: INTERNAL MEDICINE

## 2019-12-12 ENCOUNTER — TELEPHONE (OUTPATIENT)
Dept: NEUROSURGERY | Facility: CLINIC | Age: 69
End: 2019-12-12

## 2019-12-19 ENCOUNTER — HOSPITAL ENCOUNTER (OUTPATIENT)
Dept: PAIN MEDICINE | Facility: HOSPITAL | Age: 69
Discharge: HOME | End: 2019-12-19
Admitting: ANESTHESIOLOGY

## 2019-12-19 ENCOUNTER — ANESTHESIA (OUTPATIENT)
Dept: PAIN MEDICINE | Facility: HOSPITAL | Age: 69
End: 2019-12-19

## 2019-12-19 ENCOUNTER — ANESTHESIA EVENT (OUTPATIENT)
Dept: PAIN MEDICINE | Facility: HOSPITAL | Age: 69
End: 2019-12-19

## 2019-12-19 DIAGNOSIS — M54.50 LUMBAR BACK PAIN: ICD-10-CM

## 2019-12-19 PROCEDURE — G0463 HOSPITAL OUTPT CLINIC VISIT: HCPCS

## 2019-12-27 ENCOUNTER — APPOINTMENT (OUTPATIENT)
Dept: PAIN MEDICINE | Facility: HOSPITAL | Age: 69
End: 2019-12-27

## 2020-01-08 ENCOUNTER — ANESTHESIA EVENT (OUTPATIENT)
Dept: PAIN MEDICINE | Facility: HOSPITAL | Age: 70
End: 2020-01-08

## 2020-01-08 ENCOUNTER — HOSPITAL ENCOUNTER (OUTPATIENT)
Dept: PAIN MEDICINE | Facility: HOSPITAL | Age: 70
Discharge: HOME | End: 2020-01-08
Admitting: ANESTHESIOLOGY

## 2020-01-08 ENCOUNTER — HOSPITAL ENCOUNTER (OUTPATIENT)
Dept: GENERAL RADIOLOGY | Facility: HOSPITAL | Age: 70
Discharge: HOME | End: 2020-01-08

## 2020-01-08 ENCOUNTER — ANESTHESIA (OUTPATIENT)
Dept: PAIN MEDICINE | Facility: HOSPITAL | Age: 70
End: 2020-01-08

## 2020-01-08 VITALS
HEART RATE: 77 BPM | DIASTOLIC BLOOD PRESSURE: 43 MMHG | WEIGHT: 215 LBS | OXYGEN SATURATION: 96 % | SYSTOLIC BLOOD PRESSURE: 126 MMHG | RESPIRATION RATE: 16 BRPM | TEMPERATURE: 97.8 F | HEIGHT: 62 IN | BODY MASS INDEX: 39.56 KG/M2

## 2020-01-08 DIAGNOSIS — R52 PAIN: ICD-10-CM

## 2020-01-08 DIAGNOSIS — G89.29 CHRONIC BILATERAL LOW BACK PAIN WITHOUT SCIATICA: Primary | ICD-10-CM

## 2020-01-08 DIAGNOSIS — M54.50 CHRONIC BILATERAL LOW BACK PAIN WITHOUT SCIATICA: Primary | ICD-10-CM

## 2020-01-08 PROCEDURE — 77003 FLUOROGUIDE FOR SPINE INJECT: CPT

## 2020-01-08 PROCEDURE — 25010000002 METHYLPREDNISOLONE PER 80 MG: Performed by: ANESTHESIOLOGY

## 2020-01-08 PROCEDURE — C1755 CATHETER, INTRASPINAL: HCPCS

## 2020-01-08 PROCEDURE — 0 IOPAMIDOL 41 % SOLUTION: Performed by: ANESTHESIOLOGY

## 2020-01-08 RX ORDER — MIDAZOLAM HYDROCHLORIDE 1 MG/ML
1 INJECTION INTRAMUSCULAR; INTRAVENOUS AS NEEDED
Status: DISCONTINUED | OUTPATIENT
Start: 2020-01-08 | End: 2020-01-09 | Stop reason: HOSPADM

## 2020-01-08 RX ORDER — METHYLPREDNISOLONE ACETATE 80 MG/ML
80 INJECTION, SUSPENSION INTRA-ARTICULAR; INTRALESIONAL; INTRAMUSCULAR; SOFT TISSUE ONCE
Status: COMPLETED | OUTPATIENT
Start: 2020-01-08 | End: 2020-01-08

## 2020-01-08 RX ORDER — SODIUM CHLORIDE 0.9 % (FLUSH) 0.9 %
1-10 SYRINGE (ML) INJECTION AS NEEDED
Status: DISCONTINUED | OUTPATIENT
Start: 2020-01-08 | End: 2020-01-09 | Stop reason: HOSPADM

## 2020-01-08 RX ORDER — LIDOCAINE HYDROCHLORIDE 10 MG/ML
1 INJECTION, SOLUTION INFILTRATION; PERINEURAL ONCE AS NEEDED
Status: DISCONTINUED | OUTPATIENT
Start: 2020-01-08 | End: 2020-01-09 | Stop reason: HOSPADM

## 2020-01-08 RX ORDER — FENTANYL CITRATE 50 UG/ML
50 INJECTION, SOLUTION INTRAMUSCULAR; INTRAVENOUS AS NEEDED
Status: DISCONTINUED | OUTPATIENT
Start: 2020-01-08 | End: 2020-01-09 | Stop reason: HOSPADM

## 2020-01-08 RX ADMIN — IOPAMIDOL 10 ML: 408 INJECTION, SOLUTION INTRATHECAL at 12:50

## 2020-01-08 RX ADMIN — METHYLPREDNISOLONE ACETATE 80 MG: 80 INJECTION, SUSPENSION INTRA-ARTICULAR; INTRALESIONAL; INTRAMUSCULAR; SOFT TISSUE at 12:51

## 2020-01-29 ENCOUNTER — DOCUMENTATION (OUTPATIENT)
Dept: PHYSICAL THERAPY | Facility: CLINIC | Age: 70
End: 2020-01-29

## 2020-01-29 DIAGNOSIS — R26.81 DIFFICULTY STANDING: ICD-10-CM

## 2020-01-29 DIAGNOSIS — R53.1 DECREASED STRENGTH, ENDURANCE, AND MOBILITY: ICD-10-CM

## 2020-01-29 DIAGNOSIS — R26.2 DIFFICULTY WALKING: Primary | ICD-10-CM

## 2020-01-29 DIAGNOSIS — Z74.09 DECREASED STRENGTH, ENDURANCE, AND MOBILITY: ICD-10-CM

## 2020-01-29 DIAGNOSIS — R29.898 LEG WEAKNESS, BILATERAL: ICD-10-CM

## 2020-01-29 DIAGNOSIS — R68.89 DECREASED STRENGTH, ENDURANCE, AND MOBILITY: ICD-10-CM

## 2020-02-13 ENCOUNTER — ANESTHESIA EVENT (OUTPATIENT)
Dept: PAIN MEDICINE | Facility: HOSPITAL | Age: 70
End: 2020-02-13

## 2020-02-13 ENCOUNTER — HOSPITAL ENCOUNTER (OUTPATIENT)
Dept: PAIN MEDICINE | Facility: HOSPITAL | Age: 70
Discharge: HOME | End: 2020-02-13
Admitting: ANESTHESIOLOGY

## 2020-02-13 ENCOUNTER — ANESTHESIA (OUTPATIENT)
Dept: PAIN MEDICINE | Facility: HOSPITAL | Age: 70
End: 2020-02-13

## 2020-02-13 ENCOUNTER — HOSPITAL ENCOUNTER (OUTPATIENT)
Dept: GENERAL RADIOLOGY | Facility: HOSPITAL | Age: 70
Discharge: HOME | End: 2020-02-13

## 2020-02-13 VITALS
OXYGEN SATURATION: 99 % | SYSTOLIC BLOOD PRESSURE: 119 MMHG | RESPIRATION RATE: 16 BRPM | DIASTOLIC BLOOD PRESSURE: 59 MMHG | HEART RATE: 80 BPM | TEMPERATURE: 97.9 F

## 2020-02-13 DIAGNOSIS — M54.50 LUMBAR BACK PAIN: ICD-10-CM

## 2020-02-13 DIAGNOSIS — R52 PAIN: ICD-10-CM

## 2020-02-13 DIAGNOSIS — M51.36 DDD (DEGENERATIVE DISC DISEASE), LUMBAR: Primary | ICD-10-CM

## 2020-02-13 PROBLEM — M51.369 DDD (DEGENERATIVE DISC DISEASE), LUMBAR: Status: ACTIVE | Noted: 2020-02-13

## 2020-02-13 PROCEDURE — 25010000002 METHYLPREDNISOLONE PER 80 MG: Performed by: ANESTHESIOLOGY

## 2020-02-13 PROCEDURE — 77003 FLUOROGUIDE FOR SPINE INJECT: CPT

## 2020-02-13 PROCEDURE — C1755 CATHETER, INTRASPINAL: HCPCS

## 2020-02-13 PROCEDURE — 0 IOPAMIDOL 41 % SOLUTION: Performed by: ANESTHESIOLOGY

## 2020-02-13 RX ORDER — SODIUM CHLORIDE 0.9 % (FLUSH) 0.9 %
3-10 SYRINGE (ML) INJECTION AS NEEDED
Status: DISCONTINUED | OUTPATIENT
Start: 2020-02-13 | End: 2020-02-14 | Stop reason: HOSPADM

## 2020-02-13 RX ORDER — METHYLPREDNISOLONE ACETATE 80 MG/ML
80 INJECTION, SUSPENSION INTRA-ARTICULAR; INTRALESIONAL; INTRAMUSCULAR; SOFT TISSUE ONCE
Status: COMPLETED | OUTPATIENT
Start: 2020-02-13 | End: 2020-02-13

## 2020-02-13 RX ORDER — LIDOCAINE HYDROCHLORIDE 10 MG/ML
1 INJECTION, SOLUTION INFILTRATION; PERINEURAL ONCE
Status: DISCONTINUED | OUTPATIENT
Start: 2020-02-13 | End: 2020-02-14 | Stop reason: HOSPADM

## 2020-02-13 RX ORDER — SODIUM CHLORIDE 0.9 % (FLUSH) 0.9 %
3 SYRINGE (ML) INJECTION EVERY 12 HOURS SCHEDULED
Status: DISCONTINUED | OUTPATIENT
Start: 2020-02-13 | End: 2020-02-14 | Stop reason: HOSPADM

## 2020-02-13 RX ORDER — FENTANYL CITRATE 50 UG/ML
50 INJECTION, SOLUTION INTRAMUSCULAR; INTRAVENOUS AS NEEDED
Status: DISCONTINUED | OUTPATIENT
Start: 2020-02-13 | End: 2020-02-14 | Stop reason: HOSPADM

## 2020-02-13 RX ORDER — MIDAZOLAM HYDROCHLORIDE 1 MG/ML
1 INJECTION INTRAMUSCULAR; INTRAVENOUS AS NEEDED
Status: DISCONTINUED | OUTPATIENT
Start: 2020-02-13 | End: 2020-02-14 | Stop reason: HOSPADM

## 2020-02-13 RX ADMIN — METHYLPREDNISOLONE ACETATE 80 MG: 80 INJECTION, SUSPENSION INTRA-ARTICULAR; INTRALESIONAL; INTRAMUSCULAR; SOFT TISSUE at 09:07

## 2020-02-13 RX ADMIN — IOPAMIDOL 10 ML: 408 INJECTION, SOLUTION INTRATHECAL at 09:07

## 2020-08-03 ENCOUNTER — HOSPITAL ENCOUNTER (OUTPATIENT)
Dept: GENERAL RADIOLOGY | Facility: HOSPITAL | Age: 70
Discharge: HOME | End: 2020-08-03

## 2020-08-03 ENCOUNTER — HOSPITAL ENCOUNTER (OUTPATIENT)
Dept: GENERAL RADIOLOGY | Facility: HOSPITAL | Age: 70
Discharge: HOME | End: 2020-08-03
Admitting: NURSE PRACTITIONER

## 2020-08-03 DIAGNOSIS — M79.604 LEG PAIN, BILATERAL: ICD-10-CM

## 2020-08-03 DIAGNOSIS — M79.605 LEG PAIN, BILATERAL: ICD-10-CM

## 2020-08-03 DIAGNOSIS — M51.36 DDD (DEGENERATIVE DISC DISEASE), LUMBAR: ICD-10-CM

## 2020-08-03 PROCEDURE — 73521 X-RAY EXAM HIPS BI 2 VIEWS: CPT

## 2020-08-03 PROCEDURE — 72110 X-RAY EXAM L-2 SPINE 4/>VWS: CPT

## 2020-10-09 ENCOUNTER — FLU SHOT (OUTPATIENT)
Dept: FAMILY MEDICINE CLINIC | Facility: CLINIC | Age: 70
End: 2020-10-09

## 2020-10-09 DIAGNOSIS — Z23 NEED FOR INFLUENZA VACCINATION: ICD-10-CM

## 2020-10-09 PROCEDURE — 90694 VACC AIIV4 NO PRSRV 0.5ML IM: CPT | Performed by: INTERNAL MEDICINE

## 2020-10-09 PROCEDURE — G0008 ADMIN INFLUENZA VIRUS VAC: HCPCS | Performed by: INTERNAL MEDICINE

## 2020-11-23 ENCOUNTER — TELEPHONE (OUTPATIENT)
Dept: FAMILY MEDICINE CLINIC | Facility: CLINIC | Age: 70
End: 2020-11-23

## 2020-11-23 RX ORDER — FLUTICASONE PROPIONATE 50 MCG
2 SPRAY, SUSPENSION (ML) NASAL DAILY
Qty: 16 G | Refills: 0 | Status: SHIPPED | OUTPATIENT
Start: 2020-11-23 | End: 2022-10-21

## 2020-11-23 RX ORDER — BROMPHENIRAMINE MALEATE, PSEUDOEPHEDRINE HYDROCHLORIDE, AND DEXTROMETHORPHAN HYDROBROMIDE 2; 30; 10 MG/5ML; MG/5ML; MG/5ML
5 SYRUP ORAL 4 TIMES DAILY PRN
Qty: 240 ML | Refills: 0 | Status: SHIPPED | OUTPATIENT
Start: 2020-11-23 | End: 2022-10-21

## 2020-11-24 RX ORDER — FLUTICASONE PROPIONATE 50 MCG
SPRAY, SUSPENSION (ML) NASAL
Qty: 48 G | OUTPATIENT
Start: 2020-11-24

## 2020-12-22 RX ORDER — FLUTICASONE PROPIONATE 50 MCG
SPRAY, SUSPENSION (ML) NASAL
Qty: 48 G | OUTPATIENT
Start: 2020-12-22

## 2021-03-01 ENCOUNTER — TELEPHONE (OUTPATIENT)
Dept: FAMILY MEDICINE CLINIC | Facility: CLINIC | Age: 71
End: 2021-03-01

## 2022-05-04 ENCOUNTER — CLINICAL SUPPORT (OUTPATIENT)
Dept: FAMILY MEDICINE CLINIC | Facility: CLINIC | Age: 72
End: 2022-05-04

## 2022-05-04 ENCOUNTER — TELEPHONE (OUTPATIENT)
Dept: FAMILY MEDICINE CLINIC | Facility: CLINIC | Age: 72
End: 2022-05-04

## 2022-05-04 DIAGNOSIS — R43.2 LOSS OF TASTE: Primary | ICD-10-CM

## 2022-05-04 LAB
EXPIRATION DATE: NORMAL
INTERNAL CONTROL: NORMAL
Lab: NORMAL
SARS-COV-2 AG UPPER RESP QL IA.RAPID: NOT DETECTED

## 2022-05-04 PROCEDURE — 99211 OFF/OP EST MAY X REQ PHY/QHP: CPT | Performed by: INTERNAL MEDICINE

## 2022-05-04 PROCEDURE — 87426 SARSCOV CORONAVIRUS AG IA: CPT | Performed by: INTERNAL MEDICINE

## 2022-09-28 ENCOUNTER — TELEPHONE (OUTPATIENT)
Dept: FAMILY MEDICINE CLINIC | Facility: CLINIC | Age: 72
End: 2022-09-28

## 2022-10-21 ENCOUNTER — OFFICE VISIT (OUTPATIENT)
Dept: FAMILY MEDICINE CLINIC | Facility: CLINIC | Age: 72
End: 2022-10-21

## 2022-10-21 VITALS
DIASTOLIC BLOOD PRESSURE: 70 MMHG | HEIGHT: 62 IN | WEIGHT: 210 LBS | SYSTOLIC BLOOD PRESSURE: 130 MMHG | BODY MASS INDEX: 38.64 KG/M2 | RESPIRATION RATE: 20 BRPM | TEMPERATURE: 97.3 F | OXYGEN SATURATION: 99 % | HEART RATE: 99 BPM

## 2022-10-21 DIAGNOSIS — I89.0 LYMPHEDEMA: ICD-10-CM

## 2022-10-21 DIAGNOSIS — I87.2 VENOUS STASIS DERMATITIS OF LEFT LOWER EXTREMITY: ICD-10-CM

## 2022-10-21 DIAGNOSIS — Z12.31 ENCOUNTER FOR SCREENING MAMMOGRAM FOR MALIGNANT NEOPLASM OF BREAST: ICD-10-CM

## 2022-10-21 DIAGNOSIS — E03.8 SUBCLINICAL HYPOTHYROIDISM: ICD-10-CM

## 2022-10-21 DIAGNOSIS — Z12.11 SCREENING FOR MALIGNANT NEOPLASM OF COLON: ICD-10-CM

## 2022-10-21 DIAGNOSIS — D12.6 TUBULAR ADENOMA OF COLON: ICD-10-CM

## 2022-10-21 DIAGNOSIS — Z79.899 HIGH RISK MEDICATION USE: ICD-10-CM

## 2022-10-21 DIAGNOSIS — E78.2 MIXED HYPERLIPIDEMIA: Primary | ICD-10-CM

## 2022-10-21 PROCEDURE — 99214 OFFICE O/P EST MOD 30 MIN: CPT | Performed by: INTERNAL MEDICINE

## 2022-10-21 RX ORDER — LEVETIRACETAM 750 MG/1
750 TABLET ORAL 2 TIMES DAILY
COMMUNITY
Start: 2022-07-24 | End: 2022-10-21

## 2022-10-22 LAB
CHOLEST SERPL-MCNC: 161 MG/DL (ref 0–200)
HBA1C MFR BLD: 5.5 % (ref 4.8–5.6)
HDLC SERPL-MCNC: 47 MG/DL (ref 40–60)
LDLC SERPL CALC-MCNC: 79 MG/DL (ref 0–100)
T4 FREE SERPL-MCNC: 1.4 NG/DL (ref 0.93–1.7)
TRIGL SERPL-MCNC: 208 MG/DL (ref 0–150)
TSH SERPL DL<=0.005 MIU/L-ACNC: 3.22 UIU/ML (ref 0.27–4.2)
VLDLC SERPL CALC-MCNC: 35 MG/DL (ref 5–40)

## 2023-01-09 ENCOUNTER — OFFICE VISIT (OUTPATIENT)
Dept: FAMILY MEDICINE CLINIC | Facility: CLINIC | Age: 73
End: 2023-01-09
Payer: MEDICARE

## 2023-01-09 ENCOUNTER — HOSPITAL ENCOUNTER (OUTPATIENT)
Dept: GENERAL RADIOLOGY | Facility: HOSPITAL | Age: 73
Discharge: HOME | End: 2023-01-09
Admitting: NURSE PRACTITIONER
Payer: MEDICARE

## 2023-01-09 VITALS
SYSTOLIC BLOOD PRESSURE: 110 MMHG | BODY MASS INDEX: 38.46 KG/M2 | OXYGEN SATURATION: 96 % | WEIGHT: 209 LBS | HEART RATE: 91 BPM | HEIGHT: 62 IN | DIASTOLIC BLOOD PRESSURE: 78 MMHG | TEMPERATURE: 97.7 F

## 2023-01-09 DIAGNOSIS — J20.9 ACUTE BRONCHITIS, UNSPECIFIED ORGANISM: Primary | ICD-10-CM

## 2023-01-09 LAB
EXPIRATION DATE: NORMAL
FLUAV AG UPPER RESP QL IA.RAPID: NOT DETECTED
FLUBV AG UPPER RESP QL IA.RAPID: NOT DETECTED
INTERNAL CONTROL: NORMAL
Lab: NORMAL
SARS-COV-2 AG UPPER RESP QL IA.RAPID: NOT DETECTED

## 2023-01-09 PROCEDURE — 99213 OFFICE O/P EST LOW 20 MIN: CPT | Performed by: NURSE PRACTITIONER

## 2023-01-09 PROCEDURE — 87428 SARSCOV & INF VIR A&B AG IA: CPT | Performed by: NURSE PRACTITIONER

## 2023-01-09 PROCEDURE — 71046 X-RAY EXAM CHEST 2 VIEWS: CPT

## 2023-01-09 RX ORDER — ALBUTEROL SULFATE 90 UG/1
2 AEROSOL, METERED RESPIRATORY (INHALATION) EVERY 4 HOURS PRN
Qty: 18 G | Refills: 0 | Status: SHIPPED | OUTPATIENT
Start: 2023-01-09

## 2023-01-09 RX ORDER — BENZONATATE 100 MG/1
100 CAPSULE ORAL 3 TIMES DAILY PRN
Qty: 30 CAPSULE | Refills: 0 | Status: SHIPPED | OUTPATIENT
Start: 2023-01-09

## 2023-01-09 RX ORDER — GUAIFENESIN 600 MG/1
600 TABLET, EXTENDED RELEASE ORAL 2 TIMES DAILY
Qty: 20 TABLET | Refills: 0 | Status: SHIPPED | OUTPATIENT
Start: 2023-01-09

## 2023-01-23 DIAGNOSIS — J20.9 ACUTE BRONCHITIS, UNSPECIFIED ORGANISM: ICD-10-CM

## 2023-01-24 RX ORDER — ALBUTEROL SULFATE 90 UG/1
AEROSOL, METERED RESPIRATORY (INHALATION)
Qty: 8.5 G | OUTPATIENT
Start: 2023-01-24

## 2023-10-03 ENCOUNTER — TELEPHONE (OUTPATIENT)
Dept: FAMILY MEDICINE CLINIC | Facility: CLINIC | Age: 73
End: 2023-10-03

## 2025-01-21 ENCOUNTER — READMISSION MANAGEMENT (OUTPATIENT)
Dept: CALL CENTER | Facility: HOSPITAL | Age: 75
End: 2025-01-21
Payer: MEDICARE

## 2025-01-21 NOTE — OUTREACH NOTE
Prep Survey      Flowsheet Row Responses   [facility] facility patient discharged from? Non-[facility]   Is LACE score < 7 ? Non- Discharge   Eligibility [facility]   Date of Admission 01/17/25   Date of Discharge 01/20/25   Discharge Disposition Home or Self Care   Discharge diagnosis Gastrointestinal hemorrhage, unspecified gastrointestinal hemorrhage type (Primary Dx),  Symptomatic anemia,  Abdominal pain, unspecified abdominal location,  Acute GI bleeding,  Acute on chronic blood loss anemia   Does the patient have one of the following disease processes/diagnoses(primary or secondary)? Other   Prep survey completed? Yes            Yareli BOWEN - Registered Nurse

## 2025-01-22 ENCOUNTER — TRANSITIONAL CARE MANAGEMENT TELEPHONE ENCOUNTER (OUTPATIENT)
Dept: CALL CENTER | Facility: HOSPITAL | Age: 75
End: 2025-01-22
Payer: MEDICARE

## 2025-01-22 NOTE — OUTREACH NOTE
Call Center TCM Note      Flowsheet Row Responses   [facility] patient discharged from? Non-   Does the patient have one of the following disease processes/diagnoses(primary or secondary)? Other   TCM attempt successful? Yes   Revoked Reason Other  [Pt no longer under care of  PCP]            Betty Grijalva MA    1/22/2025, 15:55 EST

## 2025-02-05 ENCOUNTER — APPOINTMENT (OUTPATIENT)
Dept: CT IMAGING | Facility: HOSPITAL | Age: 75
DRG: 177 | End: 2025-02-05
Payer: MEDICARE

## 2025-02-05 ENCOUNTER — APPOINTMENT (OUTPATIENT)
Dept: GENERAL RADIOLOGY | Facility: HOSPITAL | Age: 75
DRG: 177 | End: 2025-02-05
Payer: COMMERCIAL

## 2025-02-05 ENCOUNTER — HOSPITAL ENCOUNTER (INPATIENT)
Facility: HOSPITAL | Age: 75
LOS: 2 days | Discharge: HOME HEALTH | DRG: 177 | End: 2025-02-09
Attending: EMERGENCY MEDICINE | Admitting: HOSPITALIST
Payer: MEDICARE

## 2025-02-05 DIAGNOSIS — I89.0 LYMPHEDEMA OF BOTH LOWER EXTREMITIES: ICD-10-CM

## 2025-02-05 DIAGNOSIS — R53.1 GENERALIZED WEAKNESS: Primary | ICD-10-CM

## 2025-02-05 DIAGNOSIS — U07.1 COVID-19: ICD-10-CM

## 2025-02-05 DIAGNOSIS — N39.0 ACUTE UTI: ICD-10-CM

## 2025-02-05 DIAGNOSIS — R41.82 ALTERED MENTAL STATUS, UNSPECIFIED ALTERED MENTAL STATUS TYPE: ICD-10-CM

## 2025-02-05 DIAGNOSIS — M06.9 RHEUMATOID ARTHRITIS OF OTHER SITE, UNSPECIFIED WHETHER RHEUMATOID FACTOR PRESENT: Chronic | ICD-10-CM

## 2025-02-05 LAB
ALBUMIN SERPL-MCNC: 3 G/DL (ref 3.5–5.2)
ALBUMIN/GLOB SERPL: 0.8 G/DL
ALP SERPL-CCNC: 92 U/L (ref 39–117)
ALT SERPL W P-5'-P-CCNC: 13 U/L (ref 1–33)
ANION GAP SERPL CALCULATED.3IONS-SCNC: 11 MMOL/L (ref 5–15)
AST SERPL-CCNC: 31 U/L (ref 1–32)
BASOPHILS # BLD AUTO: 0.06 10*3/MM3 (ref 0–0.2)
BASOPHILS NFR BLD AUTO: 0.6 % (ref 0–1.5)
BILIRUB SERPL-MCNC: 0.4 MG/DL (ref 0–1.2)
BUN SERPL-MCNC: 11 MG/DL (ref 8–23)
BUN/CREAT SERPL: 12.1 (ref 7–25)
CALCIUM SPEC-SCNC: 8.9 MG/DL (ref 8.6–10.5)
CHLORIDE SERPL-SCNC: 104 MMOL/L (ref 98–107)
CO2 SERPL-SCNC: 25 MMOL/L (ref 22–29)
CREAT SERPL-MCNC: 0.91 MG/DL (ref 0.57–1)
D-LACTATE SERPL-SCNC: 2.5 MMOL/L (ref 0.5–2)
DEPRECATED RDW RBC AUTO: 55.4 FL (ref 37–54)
EGFRCR SERPLBLD CKD-EPI 2021: 65.5 ML/MIN/1.73
EOSINOPHIL # BLD AUTO: 0.02 10*3/MM3 (ref 0–0.4)
EOSINOPHIL NFR BLD AUTO: 0.2 % (ref 0.3–6.2)
ERYTHROCYTE [DISTWIDTH] IN BLOOD BY AUTOMATED COUNT: 15.6 % (ref 12.3–15.4)
GLOBULIN UR ELPH-MCNC: 4 GM/DL
GLUCOSE BLDC GLUCOMTR-MCNC: 176 MG/DL (ref 70–130)
GLUCOSE SERPL-MCNC: 182 MG/DL (ref 65–99)
HCT VFR BLD AUTO: 34.9 % (ref 34–46.6)
HGB BLD-MCNC: 11.2 G/DL (ref 12–15.9)
IMM GRANULOCYTES # BLD AUTO: 0.1 10*3/MM3 (ref 0–0.05)
IMM GRANULOCYTES NFR BLD AUTO: 1 % (ref 0–0.5)
INR PPP: 1.3 (ref 0.9–1.1)
LIPASE SERPL-CCNC: 11 U/L (ref 13–60)
LYMPHOCYTES # BLD AUTO: 0.91 10*3/MM3 (ref 0.7–3.1)
LYMPHOCYTES NFR BLD AUTO: 8.9 % (ref 19.6–45.3)
MAGNESIUM SERPL-MCNC: 2.2 MG/DL (ref 1.6–2.4)
MCH RBC QN AUTO: 32.3 PG (ref 26.6–33)
MCHC RBC AUTO-ENTMCNC: 32.1 G/DL (ref 31.5–35.7)
MCV RBC AUTO: 100.6 FL (ref 79–97)
MONOCYTES # BLD AUTO: 1.74 10*3/MM3 (ref 0.1–0.9)
MONOCYTES NFR BLD AUTO: 17.1 % (ref 5–12)
NEUTROPHILS NFR BLD AUTO: 7.37 10*3/MM3 (ref 1.7–7)
NEUTROPHILS NFR BLD AUTO: 72.2 % (ref 42.7–76)
NRBC BLD AUTO-RTO: 0 /100 WBC (ref 0–0.2)
NT-PROBNP SERPL-MCNC: 693 PG/ML (ref 0–1800)
PHOSPHATE SERPL-MCNC: 2.8 MG/DL (ref 2.5–4.5)
PLATELET # BLD AUTO: 260 10*3/MM3 (ref 140–450)
PMV BLD AUTO: 10.7 FL (ref 6–12)
POTASSIUM SERPL-SCNC: 4.2 MMOL/L (ref 3.5–5.2)
PROCALCITONIN SERPL-MCNC: 0.14 NG/ML (ref 0–0.25)
PROT SERPL-MCNC: 7 G/DL (ref 6–8.5)
PROTHROMBIN TIME: 16.2 SECONDS (ref 11.7–14.2)
RBC # BLD AUTO: 3.47 10*6/MM3 (ref 3.77–5.28)
SODIUM SERPL-SCNC: 140 MMOL/L (ref 136–145)
TROPONIN T SERPL HS-MCNC: 18 NG/L
VALPROATE SERPL-MCNC: 87 MCG/ML (ref 50–125)
WBC NRBC COR # BLD AUTO: 10.2 10*3/MM3 (ref 3.4–10.8)

## 2025-02-05 PROCEDURE — 93010 ELECTROCARDIOGRAM REPORT: CPT | Performed by: INTERNAL MEDICINE

## 2025-02-05 PROCEDURE — 81001 URINALYSIS AUTO W/SCOPE: CPT | Performed by: EMERGENCY MEDICINE

## 2025-02-05 PROCEDURE — 93005 ELECTROCARDIOGRAM TRACING: CPT | Performed by: EMERGENCY MEDICINE

## 2025-02-05 PROCEDURE — 80053 COMPREHEN METABOLIC PANEL: CPT | Performed by: EMERGENCY MEDICINE

## 2025-02-05 PROCEDURE — 84484 ASSAY OF TROPONIN QUANT: CPT | Performed by: EMERGENCY MEDICINE

## 2025-02-05 PROCEDURE — 87077 CULTURE AEROBIC IDENTIFY: CPT | Performed by: EMERGENCY MEDICINE

## 2025-02-05 PROCEDURE — 87186 SC STD MICRODIL/AGAR DIL: CPT | Performed by: EMERGENCY MEDICINE

## 2025-02-05 PROCEDURE — 99285 EMERGENCY DEPT VISIT HI MDM: CPT

## 2025-02-05 PROCEDURE — 83690 ASSAY OF LIPASE: CPT | Performed by: EMERGENCY MEDICINE

## 2025-02-05 PROCEDURE — 80164 ASSAY DIPROPYLACETIC ACD TOT: CPT | Performed by: EMERGENCY MEDICINE

## 2025-02-05 PROCEDURE — 83735 ASSAY OF MAGNESIUM: CPT | Performed by: EMERGENCY MEDICINE

## 2025-02-05 PROCEDURE — 84145 PROCALCITONIN (PCT): CPT | Performed by: EMERGENCY MEDICINE

## 2025-02-05 PROCEDURE — 0202U NFCT DS 22 TRGT SARS-COV-2: CPT | Performed by: EMERGENCY MEDICINE

## 2025-02-05 PROCEDURE — 85610 PROTHROMBIN TIME: CPT | Performed by: EMERGENCY MEDICINE

## 2025-02-05 PROCEDURE — 71045 X-RAY EXAM CHEST 1 VIEW: CPT

## 2025-02-05 PROCEDURE — 83880 ASSAY OF NATRIURETIC PEPTIDE: CPT | Performed by: EMERGENCY MEDICINE

## 2025-02-05 PROCEDURE — 82948 REAGENT STRIP/BLOOD GLUCOSE: CPT

## 2025-02-05 PROCEDURE — 70450 CT HEAD/BRAIN W/O DYE: CPT

## 2025-02-05 PROCEDURE — 87086 URINE CULTURE/COLONY COUNT: CPT | Performed by: EMERGENCY MEDICINE

## 2025-02-05 PROCEDURE — 83605 ASSAY OF LACTIC ACID: CPT | Performed by: EMERGENCY MEDICINE

## 2025-02-05 PROCEDURE — 99291 CRITICAL CARE FIRST HOUR: CPT

## 2025-02-05 PROCEDURE — 36415 COLL VENOUS BLD VENIPUNCTURE: CPT

## 2025-02-05 PROCEDURE — 85025 COMPLETE CBC W/AUTO DIFF WBC: CPT | Performed by: EMERGENCY MEDICINE

## 2025-02-05 PROCEDURE — 84100 ASSAY OF PHOSPHORUS: CPT | Performed by: EMERGENCY MEDICINE

## 2025-02-05 PROCEDURE — P9612 CATHETERIZE FOR URINE SPEC: HCPCS

## 2025-02-05 RX ORDER — SODIUM CHLORIDE 0.9 % (FLUSH) 0.9 %
10 SYRINGE (ML) INJECTION AS NEEDED
Status: DISCONTINUED | OUTPATIENT
Start: 2025-02-05 | End: 2025-02-09 | Stop reason: HOSPADM

## 2025-02-05 RX ORDER — LEVETIRACETAM 500 MG/5ML
1000 INJECTION, SOLUTION, CONCENTRATE INTRAVENOUS ONCE
Status: COMPLETED | OUTPATIENT
Start: 2025-02-05 | End: 2025-02-06

## 2025-02-06 PROBLEM — U07.1 COVID-19: Status: ACTIVE | Noted: 2025-02-06

## 2025-02-06 PROBLEM — M06.9 RHEUMATOID ARTHRITIS: Chronic | Status: ACTIVE | Noted: 2025-02-06

## 2025-02-06 PROBLEM — D64.9 ANEMIA: Status: ACTIVE | Noted: 2025-02-06
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
## 2025-02-06 PROBLEM — G93.41 METABOLIC ENCEPHALOPATHY: Status: ACTIVE | Noted: 2025-02-06

## 2025-02-06 LAB
ALBUMIN SERPL-MCNC: 2.7 G/DL (ref 3.5–5.2)
ALBUMIN/GLOB SERPL: 0.7 G/DL
ALP SERPL-CCNC: 85 U/L (ref 39–117)
ALT SERPL W P-5'-P-CCNC: 8 U/L (ref 1–33)
ANION GAP SERPL CALCULATED.3IONS-SCNC: 8.3 MMOL/L (ref 5–15)
AST SERPL-CCNC: 31 U/L (ref 1–32)
B PARAPERT DNA SPEC QL NAA+PROBE: NOT DETECTED
B PERT DNA SPEC QL NAA+PROBE: NOT DETECTED
BACTERIA UR QL AUTO: ABNORMAL /HPF
BASOPHILS # BLD AUTO: 0.02 10*3/MM3 (ref 0–0.2)
BASOPHILS NFR BLD AUTO: 0.2 % (ref 0–1.5)
BILIRUB SERPL-MCNC: 0.4 MG/DL (ref 0–1.2)
BILIRUB UR QL STRIP: NEGATIVE
BUN SERPL-MCNC: 11 MG/DL (ref 8–23)
BUN/CREAT SERPL: 13.9 (ref 7–25)
C PNEUM DNA NPH QL NAA+NON-PROBE: NOT DETECTED
CALCIUM SPEC-SCNC: 8.4 MG/DL (ref 8.6–10.5)
CHLORIDE SERPL-SCNC: 106 MMOL/L (ref 98–107)
CLARITY UR: ABNORMAL
CO2 SERPL-SCNC: 23.7 MMOL/L (ref 22–29)
COLOR UR: YELLOW
CREAT SERPL-MCNC: 0.79 MG/DL (ref 0.57–1)
D-LACTATE SERPL-SCNC: 1.2 MMOL/L (ref 0.5–2)
DEPRECATED RDW RBC AUTO: 55.5 FL (ref 37–54)
EGFRCR SERPLBLD CKD-EPI 2021: 77.6 ML/MIN/1.73
EOSINOPHIL # BLD AUTO: 0.01 10*3/MM3 (ref 0–0.4)
EOSINOPHIL NFR BLD AUTO: 0.1 % (ref 0.3–6.2)
ERYTHROCYTE [DISTWIDTH] IN BLOOD BY AUTOMATED COUNT: 15.1 % (ref 12.3–15.4)
FLUAV SUBTYP SPEC NAA+PROBE: NOT DETECTED
FLUBV RNA ISLT QL NAA+PROBE: NOT DETECTED
FOLATE SERPL-MCNC: >20 NG/ML (ref 4.78–24.2)
GEN 5 1HR TROPONIN T REFLEX: 18 NG/L
GLOBULIN UR ELPH-MCNC: 3.7 GM/DL
GLUCOSE SERPL-MCNC: 120 MG/DL (ref 65–99)
GLUCOSE UR STRIP-MCNC: NEGATIVE MG/DL
HADV DNA SPEC NAA+PROBE: NOT DETECTED
HCOV 229E RNA SPEC QL NAA+PROBE: NOT DETECTED
HCOV HKU1 RNA SPEC QL NAA+PROBE: NOT DETECTED
HCOV NL63 RNA SPEC QL NAA+PROBE: NOT DETECTED
HCOV OC43 RNA SPEC QL NAA+PROBE: NOT DETECTED
HCT VFR BLD AUTO: 27.4 % (ref 34–46.6)
HCT VFR BLD AUTO: 28.2 % (ref 34–46.6)
HGB BLD-MCNC: 8.4 G/DL (ref 12–15.9)
HGB BLD-MCNC: 9.3 G/DL (ref 12–15.9)
HGB UR QL STRIP.AUTO: ABNORMAL
HMPV RNA NPH QL NAA+NON-PROBE: NOT DETECTED
HPIV1 RNA ISLT QL NAA+PROBE: NOT DETECTED
HPIV2 RNA SPEC QL NAA+PROBE: NOT DETECTED
HPIV3 RNA NPH QL NAA+PROBE: NOT DETECTED
HPIV4 P GENE NPH QL NAA+PROBE: NOT DETECTED
HYALINE CASTS UR QL AUTO: ABNORMAL /LPF
IMM GRANULOCYTES # BLD AUTO: 0.06 10*3/MM3 (ref 0–0.05)
IMM GRANULOCYTES NFR BLD AUTO: 0.6 % (ref 0–0.5)
KETONES UR QL STRIP: ABNORMAL
LEUKOCYTE ESTERASE UR QL STRIP.AUTO: ABNORMAL
LYMPHOCYTES # BLD AUTO: 1.16 10*3/MM3 (ref 0.7–3.1)
LYMPHOCYTES NFR BLD AUTO: 12.4 % (ref 19.6–45.3)
M PNEUMO IGG SER IA-ACNC: NOT DETECTED
MAGNESIUM SERPL-MCNC: 2.1 MG/DL (ref 1.6–2.4)
MCH RBC QN AUTO: 31.2 PG (ref 26.6–33)
MCHC RBC AUTO-ENTMCNC: 30.7 G/DL (ref 31.5–35.7)
MCV RBC AUTO: 101.9 FL (ref 79–97)
MONOCYTES # BLD AUTO: 1.05 10*3/MM3 (ref 0.1–0.9)
MONOCYTES NFR BLD AUTO: 11.2 % (ref 5–12)
NEUTROPHILS NFR BLD AUTO: 7.09 10*3/MM3 (ref 1.7–7)
NEUTROPHILS NFR BLD AUTO: 75.5 % (ref 42.7–76)
NITRITE UR QL STRIP: NEGATIVE
NRBC BLD AUTO-RTO: 0 /100 WBC (ref 0–0.2)
PH UR STRIP.AUTO: 8 [PH] (ref 5–8)
PHOSPHATE SERPL-MCNC: 4.2 MG/DL (ref 2.5–4.5)
PLATELET # BLD AUTO: 177 10*3/MM3 (ref 140–450)
PMV BLD AUTO: 11.3 FL (ref 6–12)
POTASSIUM SERPL-SCNC: 4.4 MMOL/L (ref 3.5–5.2)
PROT SERPL-MCNC: 6.4 G/DL (ref 6–8.5)
PROT UR QL STRIP: ABNORMAL
QT INTERVAL: 331 MS
QTC INTERVAL: 423 MS
RBC # BLD AUTO: 2.69 10*6/MM3 (ref 3.77–5.28)
RBC # UR STRIP: ABNORMAL /HPF
REF LAB TEST METHOD: ABNORMAL
RHINOVIRUS RNA SPEC NAA+PROBE: NOT DETECTED
RSV RNA NPH QL NAA+NON-PROBE: NOT DETECTED
SARS-COV-2 RNA NPH QL NAA+NON-PROBE: DETECTED
SODIUM SERPL-SCNC: 138 MMOL/L (ref 136–145)
SP GR UR STRIP: 1.02 (ref 1–1.03)
SQUAMOUS #/AREA URNS HPF: ABNORMAL /HPF
T4 FREE SERPL-MCNC: 1.51 NG/DL (ref 0.92–1.68)
TROPONIN T % DELTA: 0
TROPONIN T NUMERIC DELTA: 0 NG/L
TSH SERPL DL<=0.05 MIU/L-ACNC: 5.37 UIU/ML (ref 0.27–4.2)
UROBILINOGEN UR QL STRIP: ABNORMAL
VIT B12 BLD-MCNC: 1710 PG/ML (ref 211–946)
WBC # UR STRIP: ABNORMAL /HPF
WBC NRBC COR # BLD AUTO: 9.39 10*3/MM3 (ref 3.4–10.8)

## 2025-02-06 PROCEDURE — 25010000002 LEVETRIRACETAM PER 10 MG: Performed by: EMERGENCY MEDICINE

## 2025-02-06 PROCEDURE — 83605 ASSAY OF LACTIC ACID: CPT | Performed by: EMERGENCY MEDICINE

## 2025-02-06 PROCEDURE — 83735 ASSAY OF MAGNESIUM: CPT | Performed by: STUDENT IN AN ORGANIZED HEALTH CARE EDUCATION/TRAINING PROGRAM

## 2025-02-06 PROCEDURE — G0378 HOSPITAL OBSERVATION PER HR: HCPCS

## 2025-02-06 PROCEDURE — 82746 ASSAY OF FOLIC ACID SERUM: CPT | Performed by: STUDENT IN AN ORGANIZED HEALTH CARE EDUCATION/TRAINING PROGRAM

## 2025-02-06 PROCEDURE — 25010000002 THIAMINE HCL 200 MG/2ML SOLUTION 2 ML VIAL: Performed by: STUDENT IN AN ORGANIZED HEALTH CARE EDUCATION/TRAINING PROGRAM

## 2025-02-06 PROCEDURE — 80053 COMPREHEN METABOLIC PANEL: CPT | Performed by: STUDENT IN AN ORGANIZED HEALTH CARE EDUCATION/TRAINING PROGRAM

## 2025-02-06 PROCEDURE — 63710000001 PREDNISONE PER 5 MG: Performed by: STUDENT IN AN ORGANIZED HEALTH CARE EDUCATION/TRAINING PROGRAM

## 2025-02-06 PROCEDURE — 25010000002 ONDANSETRON PER 1 MG

## 2025-02-06 PROCEDURE — 85014 HEMATOCRIT: CPT | Performed by: STUDENT IN AN ORGANIZED HEALTH CARE EDUCATION/TRAINING PROGRAM

## 2025-02-06 PROCEDURE — 84443 ASSAY THYROID STIM HORMONE: CPT | Performed by: STUDENT IN AN ORGANIZED HEALTH CARE EDUCATION/TRAINING PROGRAM

## 2025-02-06 PROCEDURE — 25010000002 CEFTRIAXONE PER 250 MG: Performed by: PHYSICIAN ASSISTANT

## 2025-02-06 PROCEDURE — 99204 OFFICE O/P NEW MOD 45 MIN: CPT | Performed by: STUDENT IN AN ORGANIZED HEALTH CARE EDUCATION/TRAINING PROGRAM

## 2025-02-06 PROCEDURE — 85018 HEMOGLOBIN: CPT | Performed by: STUDENT IN AN ORGANIZED HEALTH CARE EDUCATION/TRAINING PROGRAM

## 2025-02-06 PROCEDURE — 84100 ASSAY OF PHOSPHORUS: CPT | Performed by: STUDENT IN AN ORGANIZED HEALTH CARE EDUCATION/TRAINING PROGRAM

## 2025-02-06 PROCEDURE — 85025 COMPLETE CBC W/AUTO DIFF WBC: CPT | Performed by: STUDENT IN AN ORGANIZED HEALTH CARE EDUCATION/TRAINING PROGRAM

## 2025-02-06 PROCEDURE — 25010000002 ENOXAPARIN PER 10 MG

## 2025-02-06 PROCEDURE — 84439 ASSAY OF FREE THYROXINE: CPT | Performed by: STUDENT IN AN ORGANIZED HEALTH CARE EDUCATION/TRAINING PROGRAM

## 2025-02-06 PROCEDURE — 82607 VITAMIN B-12: CPT | Performed by: STUDENT IN AN ORGANIZED HEALTH CARE EDUCATION/TRAINING PROGRAM

## 2025-02-06 RX ORDER — ONDANSETRON 2 MG/ML
4 INJECTION INTRAMUSCULAR; INTRAVENOUS EVERY 6 HOURS PRN
Status: DISCONTINUED | OUTPATIENT
Start: 2025-02-06 | End: 2025-02-09 | Stop reason: HOSPADM

## 2025-02-06 RX ORDER — ENOXAPARIN SODIUM 100 MG/ML
40 INJECTION SUBCUTANEOUS EVERY 24 HOURS
Status: DISCONTINUED | OUTPATIENT
Start: 2025-02-06 | End: 2025-02-06

## 2025-02-06 RX ORDER — PREDNISONE 1 MG/1
1 TABLET ORAL DAILY
Status: DISCONTINUED | OUTPATIENT
Start: 2025-02-06 | End: 2025-02-09 | Stop reason: HOSPADM

## 2025-02-06 RX ORDER — AMOXICILLIN 250 MG
2 CAPSULE ORAL 2 TIMES DAILY PRN
Status: DISCONTINUED | OUTPATIENT
Start: 2025-02-06 | End: 2025-02-09 | Stop reason: HOSPADM

## 2025-02-06 RX ORDER — LEVETIRACETAM 500 MG/1
1500 TABLET ORAL 2 TIMES DAILY
Status: DISCONTINUED | OUTPATIENT
Start: 2025-02-06 | End: 2025-02-07

## 2025-02-06 RX ORDER — NITROGLYCERIN 0.4 MG/1
0.4 TABLET SUBLINGUAL
Status: DISCONTINUED | OUTPATIENT
Start: 2025-02-06 | End: 2025-02-09 | Stop reason: HOSPADM

## 2025-02-06 RX ORDER — ACETAMINOPHEN 325 MG/1
650 TABLET ORAL EVERY 4 HOURS PRN
Status: DISCONTINUED | OUTPATIENT
Start: 2025-02-06 | End: 2025-02-09 | Stop reason: HOSPADM

## 2025-02-06 RX ORDER — BISACODYL 5 MG/1
5 TABLET, DELAYED RELEASE ORAL DAILY PRN
Status: DISCONTINUED | OUTPATIENT
Start: 2025-02-06 | End: 2025-02-09 | Stop reason: HOSPADM

## 2025-02-06 RX ORDER — VALPROIC ACID 250 MG/1
250 CAPSULE ORAL EVERY 12 HOURS SCHEDULED
Status: DISCONTINUED | OUTPATIENT
Start: 2025-02-06 | End: 2025-02-07

## 2025-02-06 RX ORDER — BISACODYL 10 MG
10 SUPPOSITORY, RECTAL RECTAL DAILY PRN
Status: DISCONTINUED | OUTPATIENT
Start: 2025-02-06 | End: 2025-02-09 | Stop reason: HOSPADM

## 2025-02-06 RX ORDER — LEVETIRACETAM 500 MG/1
1000 TABLET ORAL 2 TIMES DAILY
Status: DISCONTINUED | OUTPATIENT
Start: 2025-02-06 | End: 2025-02-06

## 2025-02-06 RX ORDER — ONDANSETRON 4 MG/1
4 TABLET, ORALLY DISINTEGRATING ORAL EVERY 6 HOURS PRN
Status: DISCONTINUED | OUTPATIENT
Start: 2025-02-06 | End: 2025-02-09 | Stop reason: HOSPADM

## 2025-02-06 RX ORDER — POLYETHYLENE GLYCOL 3350 17 G/17G
17 POWDER, FOR SOLUTION ORAL DAILY PRN
Status: DISCONTINUED | OUTPATIENT
Start: 2025-02-06 | End: 2025-02-09 | Stop reason: HOSPADM

## 2025-02-06 RX ORDER — LEVETIRACETAM 500 MG/1
500 TABLET ORAL EVERY 12 HOURS SCHEDULED
Status: DISCONTINUED | OUTPATIENT
Start: 2025-02-06 | End: 2025-02-06

## 2025-02-06 RX ADMIN — VALPROIC ACID 250 MG: 250 CAPSULE, LIQUID FILLED ORAL at 20:15

## 2025-02-06 RX ADMIN — LEVETIRACETAM 1000 MG: 100 INJECTION INTRAVENOUS at 00:31

## 2025-02-06 RX ADMIN — CEFTRIAXONE SODIUM 1000 MG: 1 INJECTION, POWDER, FOR SOLUTION INTRAMUSCULAR; INTRAVENOUS at 01:20

## 2025-02-06 RX ADMIN — THIAMINE HYDROCHLORIDE 500 MG: 100 INJECTION, SOLUTION INTRAMUSCULAR; INTRAVENOUS at 17:17

## 2025-02-06 RX ADMIN — LEVETIRACETAM 1500 MG: 500 TABLET, FILM COATED ORAL at 20:15

## 2025-02-06 RX ADMIN — ONDANSETRON 4 MG: 2 INJECTION, SOLUTION INTRAMUSCULAR; INTRAVENOUS at 06:31

## 2025-02-06 RX ADMIN — NIRMATRELVIR AND RITONAVIR 3 TABLET: KIT at 08:50

## 2025-02-06 RX ADMIN — LEVETIRACETAM 1000 MG: 500 TABLET, FILM COATED ORAL at 08:50

## 2025-02-06 RX ADMIN — PREDNISONE 1 MG: 1 TABLET ORAL at 08:51

## 2025-02-06 RX ADMIN — NIRMATRELVIR AND RITONAVIR 3 TABLET: KIT at 20:15

## 2025-02-06 RX ADMIN — ENOXAPARIN SODIUM 40 MG: 100 INJECTION SUBCUTANEOUS at 08:51

## 2025-02-06 NOTE — ED PROVIDER NOTES
EMERGENCY DEPARTMENT ENCOUNTER    Room number:  19/19  Date Seen:  2/6/2025  PCP:  Tony Chanel APRN    Laboratory Results:  Recent Results (from the past 24 hours)   POC Glucose Once    Collection Time: 02/05/25 10:18 PM    Specimen: Blood   Result Value Ref Range    Glucose 176 (H) 70 - 130 mg/dL   Comprehensive Metabolic Panel    Collection Time: 02/05/25 10:27 PM    Specimen: Blood   Result Value Ref Range    Glucose 182 (H) 65 - 99 mg/dL    BUN 11 8 - 23 mg/dL    Creatinine 0.91 0.57 - 1.00 mg/dL    Sodium 140 136 - 145 mmol/L    Potassium 4.2 3.5 - 5.2 mmol/L    Chloride 104 98 - 107 mmol/L    CO2 25.0 22.0 - 29.0 mmol/L    Calcium 8.9 8.6 - 10.5 mg/dL    Total Protein 7.0 6.0 - 8.5 g/dL    Albumin 3.0 (L) 3.5 - 5.2 g/dL    ALT (SGPT) 13 1 - 33 U/L    AST (SGOT) 31 1 - 32 U/L    Alkaline Phosphatase 92 39 - 117 U/L    Total Bilirubin 0.4 0.0 - 1.2 mg/dL    Globulin 4.0 gm/dL    A/G Ratio 0.8 g/dL    BUN/Creatinine Ratio 12.1 7.0 - 25.0    Anion Gap 11.0 5.0 - 15.0 mmol/L    eGFR 65.5 >60.0 mL/min/1.73   Protime-INR    Collection Time: 02/05/25 10:27 PM    Specimen: Blood   Result Value Ref Range    Protime 16.2 (H) 11.7 - 14.2 Seconds    INR 1.30 (H) 0.90 - 1.10   Lipase    Collection Time: 02/05/25 10:27 PM    Specimen: Blood   Result Value Ref Range    Lipase 11 (L) 13 - 60 U/L   BNP    Collection Time: 02/05/25 10:27 PM    Specimen: Blood   Result Value Ref Range    proBNP 693.0 0.0 - 1,800.0 pg/mL   High Sensitivity Troponin T    Collection Time: 02/05/25 10:27 PM    Specimen: Blood   Result Value Ref Range    HS Troponin T 18 (H) <14 ng/L   Lactic Acid, Plasma    Collection Time: 02/05/25 10:27 PM    Specimen: Blood   Result Value Ref Range    Lactate 2.5 (C) 0.5 - 2.0 mmol/L   Procalcitonin    Collection Time: 02/05/25 10:27 PM    Specimen: Blood   Result Value Ref Range    Procalcitonin 0.14 0.00 - 0.25 ng/mL   Magnesium    Collection Time: 02/05/25 10:27 PM    Specimen: Blood   Result Value Ref  Range    Magnesium 2.2 1.6 - 2.4 mg/dL   Phosphorus    Collection Time: 02/05/25 10:27 PM    Specimen: Blood   Result Value Ref Range    Phosphorus 2.8 2.5 - 4.5 mg/dL   CBC Auto Differential    Collection Time: 02/05/25 10:27 PM    Specimen: Blood   Result Value Ref Range    WBC 10.20 3.40 - 10.80 10*3/mm3    RBC 3.47 (L) 3.77 - 5.28 10*6/mm3    Hemoglobin 11.2 (L) 12.0 - 15.9 g/dL    Hematocrit 34.9 34.0 - 46.6 %    .6 (H) 79.0 - 97.0 fL    MCH 32.3 26.6 - 33.0 pg    MCHC 32.1 31.5 - 35.7 g/dL    RDW 15.6 (H) 12.3 - 15.4 %    RDW-SD 55.4 (H) 37.0 - 54.0 fl    MPV 10.7 6.0 - 12.0 fL    Platelets 260 140 - 450 10*3/mm3    Neutrophil % 72.2 42.7 - 76.0 %    Lymphocyte % 8.9 (L) 19.6 - 45.3 %    Monocyte % 17.1 (H) 5.0 - 12.0 %    Eosinophil % 0.2 (L) 0.3 - 6.2 %    Basophil % 0.6 0.0 - 1.5 %    Immature Grans % 1.0 (H) 0.0 - 0.5 %    Neutrophils, Absolute 7.37 (H) 1.70 - 7.00 10*3/mm3    Lymphocytes, Absolute 0.91 0.70 - 3.10 10*3/mm3    Monocytes, Absolute 1.74 (H) 0.10 - 0.90 10*3/mm3    Eosinophils, Absolute 0.02 0.00 - 0.40 10*3/mm3    Basophils, Absolute 0.06 0.00 - 0.20 10*3/mm3    Immature Grans, Absolute 0.10 (H) 0.00 - 0.05 10*3/mm3    nRBC 0.0 0.0 - 0.2 /100 WBC   Valproic Acid Level, Total    Collection Time: 02/05/25 10:27 PM    Specimen: Blood   Result Value Ref Range    Valproic Acid 87.0 50.0 - 125.0 mcg/mL   Respiratory Panel PCR w/COVID-19(SARS-CoV-2) MICHELLE/BRITTANY/HIRAL/PAD/COR/FÁTIMA In-House, NP Swab in UTM/VTM, 2 HR TAT - Swab, Nasopharynx    Collection Time: 02/05/25 10:30 PM    Specimen: Nasopharynx; Swab   Result Value Ref Range    ADENOVIRUS, PCR Not Detected Not Detected    Coronavirus 229E Not Detected Not Detected    Coronavirus HKU1 Not Detected Not Detected    Coronavirus NL63 Not Detected Not Detected    Coronavirus OC43 Not Detected Not Detected    COVID19 Detected (C) Not Detected - Ref. Range    Human Metapneumovirus Not Detected Not Detected    Human Rhinovirus/Enterovirus Not Detected  Not Detected    Influenza A PCR Not Detected Not Detected    Influenza B PCR Not Detected Not Detected    Parainfluenza Virus 1 Not Detected Not Detected    Parainfluenza Virus 2 Not Detected Not Detected    Parainfluenza Virus 3 Not Detected Not Detected    Parainfluenza Virus 4 Not Detected Not Detected    RSV, PCR Not Detected Not Detected    Bordetella pertussis pcr Not Detected Not Detected    Bordetella parapertussis PCR Not Detected Not Detected    Chlamydophila pneumoniae PCR Not Detected Not Detected    Mycoplasma pneumo by PCR Not Detected Not Detected   ECG 12 Lead Electrolyte Imbalance    Collection Time: 02/05/25 10:32 PM   Result Value Ref Range    QT Interval 331 ms    QTC Interval 423 ms   Urinalysis With Culture If Indicated - Urine, Catheter    Collection Time: 02/05/25 11:42 PM    Specimen: Urine, Catheter   Result Value Ref Range    Color, UA Yellow Yellow, Straw    Appearance, UA Cloudy (A) Clear    pH, UA 8.0 5.0 - 8.0    Specific Gravity, UA 1.021 1.005 - 1.030    Glucose, UA Negative Negative    Ketones, UA Trace (A) Negative    Bilirubin, UA Negative Negative    Blood, UA Trace (A) Negative    Protein, UA 30 mg/dL (1+) (A) Negative    Leuk Esterase, UA Moderate (2+) (A) Negative    Nitrite, UA Negative Negative    Urobilinogen, UA 0.2 E.U./dL 0.2 - 1.0 E.U./dL   Urinalysis, Microscopic Only - Urine, Catheter    Collection Time: 02/05/25 11:42 PM    Specimen: Urine, Catheter   Result Value Ref Range    RBC, UA 0-2 None Seen, 0-2 /HPF    WBC, UA Too Numerous to Count (A) None Seen, 0-2 /HPF    Bacteria, UA None Seen None Seen /HPF    Squamous Epithelial Cells, UA 0-2 None Seen, 0-2 /HPF    Hyaline Casts, UA None Seen None Seen /LPF    Methodology Automated Microscopy    High Sensitivity Troponin T 1Hr    Collection Time: 02/05/25 11:46 PM    Specimen: Blood   Result Value Ref Range    HS Troponin T 18 (H) <14 ng/L    Troponin T Numeric Delta 0 ng/L    Troponin T % Delta 0 Abnormal if >/= 20%    STAT Lactic Acid, Reflex    Collection Time: 02/06/25  1:24 AM    Specimen: Blood   Result Value Ref Range    Lactate 1.2 0.5 - 2.0 mmol/L     I reviewed the above results.    Radiology:  CT Head Without Contrast   Final Result   No acute intracranial findings.       Radiation dose reduction techniques were utilized, including automated   exposure control and exposure modulation based on body size.           This report was finalized on 2/5/2025 11:27 PM by Dr. Antoinette Samyaoa M.D on Workstation: [email or web address]          XR Chest 1 View   Final Result   No acute findings.       This report was finalized on 2/5/2025 11:03 PM by Dr. Antoinette Samayoa M.D on Workstation: [email or web address]            I reviewed the above results    Medications ordered in ED:  Medications   sodium chloride 0.9 % flush 10 mL (has no administration in time range)   nitroglycerin (NITROSTAT) SL tablet 0.4 mg (has no administration in time range)   acetaminophen (TYLENOL) tablet 650 mg (has no administration in time range)   sennosides-docusate (PERICOLACE) 8.6-50 MG per tablet 2 tablet (has no administration in time range)     And   polyethylene glycol (MIRALAX) packet 17 g (has no administration in time range)     And   bisacodyl (DULCOLAX) EC tablet 5 mg (has no administration in time range)     And   bisacodyl (DULCOLAX) suppository 10 mg (has no administration in time range)   ondansetron ODT (ZOFRAN-ODT) disintegrating tablet 4 mg (has no administration in time range)     Or   ondansetron (ZOFRAN) injection 4 mg (has no administration in time range)   nirmatrelvir and ritonavir (300mg/100mg)(PAXLOVID) 3 tablet pack (has no administration in time range)   Pharmacy to Dose enoxaparin (LOVENOX) (has no administration in time range)   levETIRAcetam (KEPPRA) injection 1,000 mg (1,000 mg Intravenous Given 2/6/25 0031)   cefTRIAXone (ROCEPHIN) 1,000 mg in sodium chloride 0.9 % 100 mL MBP (1,000 mg Intravenous New Bag 2/6/25 0120)       ED  Course as of 02/06/25 0228   Wed Feb 05, 2025 2247 EKG ER MD interpretation   Time: 10: 30 2 PM  Rhythm and rate: Normal sinus rhythm at a rate of 98  Axis: Normal  P waves: Normal  QRS complexes: Normal  ST segments: no elevation nor depressions  T waves: no flattening or inversions  Comparison EKG is from September 2, 2014 [AR]   2248 I viewed patient's chest x-ray and on my interpretation patient is quite rotated with no pleural effusions [AR]   u Feb 06, 2025   0004 Patient was turned over to me by Dr. Bennett.  Pending: labs and admission   [CC]   0005 COVID19(!!): Detected [CC]   0024 WBC, UA(!): Too Numerous to Count [CC]   0024 Bacteria, UA: None Seen [CC]   0104 Spoke with ROOPA Douglas with A.  Reviewed history, exam, results, treatments.  She agrees admit the patient to Dr. Martinez.   [CC]      ED Course User Index  [AR] Lesley Bennett MD  [CC] Millie Sy, SANDEEP       Diagnosis:  Final diagnoses:   Generalized weakness   Altered mental status, unspecified altered mental status type   Lymphedema of both lower extremities   Acute UTI   COVID-19         Provider attestation:  I personally reviewed the past medical history, past surgical history, social history, family history, current medications, and allergies as they appear in the chart.    The patient was seen and examined by myself and Dr. Bennett, who agree with plan.      iMllie Sy PA-C  02/06/25 0228

## 2025-02-06 NOTE — ED NOTES
Nursing report ED to floor  Tianna Walker  74 y.o.  female    HPI :  HPI  Stated Reason for Visit: pt arrived via EMS for complaints of weakness and confusion from home [address]. EMS states pt was only alert to self, baseline AxO x 4. Pt states she fell twice in the last few days and ambulates with walker. Upon arrival pt is AXO x 3 and follows commands.  History Obtained From: patient, EMS    Chief Complaint  Chief Complaint   Patient presents with    Neurologic Problem       Admitting doctor:   Eleazar Martinez MD    Admitting diagnosis:   The primary encounter diagnosis was Generalized weakness. Diagnoses of Altered mental status, unspecified altered mental status type, Lymphedema of both lower extremities, Acute UTI, and COVID-19 were also pertinent to this visit.    Code status:   Current Code Status       Date Active Code Status Order ID Comments User Context       Not on file            Allergies:   Aspirin, Oxcarbazepine, Topamax [topiramate], Celecoxib, Codeine, Diclofenac, Fluticasone, Lamictal [lamotrigine], and Metaxalone    Isolation:   Enhanced Droplet/Contact     Intake and Output    Intake/Output Summary (Last 24 hours) at 2/6/2025 0117  Last data filed at 2/5/2025 2217  Gross per 24 hour   Intake 500 ml   Output --   Net 500 ml       Weight:   There were no vitals filed for this visit.    Most recent vitals:   Vitals:    02/05/25 2219   BP: 137/59   Pulse: 109   Resp: 24   Temp: 98.5 °F (36.9 °C)   TempSrc: Tympanic   SpO2: 96%       Active LDAs/IV Access:   Lines, Drains & Airways       Active LDAs       Name Placement date Placement time Site Days    Peripheral IV 02/05/25 2218 Right Antecubital 02/05/25 2218  Antecubital  less than 1                    Labs (abnormal labs have a star):   Labs Reviewed   RESPIRATORY PANEL PCR W/ COVID-19 (SARS-COV-2), NP SWAB IN UTM/VTP, 2 HR TAT - Abnormal; Notable for the following components:       Result Value    COVID19 Detected (*)     All other components  within normal limits    Narrative:     In the setting of a positive respiratory panel with a viral infection PLUS a negative procalcitonin without other underlying concern for bacterial infection, consider observing off antibiotics or discontinuation of antibiotics and continue supportive care. If the respiratory panel is positive for atypical bacterial infection (Bordetella pertussis, Chlamydophila pneumoniae, or Mycoplasma pneumoniae), consider antibiotic de-escalation to target atypical bacterial infection.   COMPREHENSIVE METABOLIC PANEL - Abnormal; Notable for the following components:    Glucose 182 (*)     Albumin 3.0 (*)     All other components within normal limits    Narrative:     GFR Categories in Chronic Kidney Disease (CKD)      GFR Category          GFR (mL/min/1.73)    Interpretation  G1                     90 or greater         Normal or high (1)  G2                      60-89                Mild decrease (1)  G3a                   45-59                Mild to moderate decrease  G3b                   30-44                Moderate to severe decrease  G4                    15-29                Severe decrease  G5                    14 or less           Kidney failure          (1)In the absence of evidence of kidney disease, neither GFR category G1 or G2 fulfill the criteria for CKD.    eGFR calculation 2021 CKD-EPI creatinine equation, which does not include race as a factor   PROTIME-INR - Abnormal; Notable for the following components:    Protime 16.2 (*)     INR 1.30 (*)     All other components within normal limits   LIPASE - Abnormal; Notable for the following components:    Lipase 11 (*)     All other components within normal limits   URINALYSIS W/ CULTURE IF INDICATED - Abnormal; Notable for the following components:    Appearance, UA Cloudy (*)     Ketones, UA Trace (*)     Blood, UA Trace (*)     Protein, UA 30 mg/dL (1+) (*)     Leuk Esterase, UA Moderate (2+) (*)     All other components  within normal limits    Narrative:     In absence of clinical symptoms, the presence of pyuria, bacteria, and/or nitrites on the urinalysis result does not correlate with infection.   TROPONIN - Abnormal; Notable for the following components:    HS Troponin T 18 (*)     All other components within normal limits    Narrative:     High Sensitive Troponin T Reference Range:  <14.0 ng/L- Negative Female for AMI  <22.0 ng/L- Negative Male for AMI  >=14 - Abnormal Female indicating possible myocardial injury.  >=22 - Abnormal Male indicating possible myocardial injury.   Clinicians would have to utilize clinical acumen, EKG, Troponin, and serial changes to determine if it is an Acute Myocardial Infarction or myocardial injury due to an underlying chronic condition.        LACTIC ACID, PLASMA - Abnormal; Notable for the following components:    Lactate 2.5 (*)     All other components within normal limits   CBC WITH AUTO DIFFERENTIAL - Abnormal; Notable for the following components:    RBC 3.47 (*)     Hemoglobin 11.2 (*)     .6 (*)     RDW 15.6 (*)     RDW-SD 55.4 (*)     Lymphocyte % 8.9 (*)     Monocyte % 17.1 (*)     Eosinophil % 0.2 (*)     Immature Grans % 1.0 (*)     Neutrophils, Absolute 7.37 (*)     Monocytes, Absolute 1.74 (*)     Immature Grans, Absolute 0.10 (*)     All other components within normal limits   HIGH SENSITIVITIY TROPONIN T 1HR - Abnormal; Notable for the following components:    HS Troponin T 18 (*)     All other components within normal limits    Narrative:     High Sensitive Troponin T Reference Range:  <14.0 ng/L- Negative Female for AMI  <22.0 ng/L- Negative Male for AMI  >=14 - Abnormal Female indicating possible myocardial injury.  >=22 - Abnormal Male indicating possible myocardial injury.   Clinicians would have to utilize clinical acumen, EKG, Troponin, and serial changes to determine if it is an Acute Myocardial Infarction or myocardial injury due to an underlying chronic  "condition.        URINALYSIS, MICROSCOPIC ONLY - Abnormal; Notable for the following components:    WBC, UA Too Numerous to Count (*)     All other components within normal limits   POCT GLUCOSE FINGERSTICK - Abnormal; Notable for the following components:    Glucose 176 (*)     All other components within normal limits   BNP (IN-HOUSE) - Normal    Narrative:     This assay is used as an aid in the diagnosis of individuals suspected of having heart failure. It can be used as an aid in the diagnosis of acute decompensated heart failure (ADHF) in patients presenting with signs and symptoms of ADHF to the emergency department (ED). In addition, NT-proBNP of <300 pg/mL indicates ADHF is not likely.    Age Range Result Interpretation  NT-proBNP Concentration (pg/mL:      <50             Positive            >450                   Gray                 300-450                    Negative             <300    50-75           Positive            >900                  Gray                300-900                  Negative            <300      >75             Positive            >1800                  Gray                300-1800                  Negative            <300   PROCALCITONIN - Normal    Narrative:     As a Marker for Sepsis (Non-Neonates):    1. <0.5 ng/mL represents a low risk of severe sepsis and/or septic shock.  2. >2 ng/mL represents a high risk of severe sepsis and/or septic shock.    As a Marker for Lower Respiratory Tract Infections that require antibiotic therapy:    PCT on Admission    Antibiotic Therapy       6-12 Hrs later    >0.5                Strongly Recommended  >0.25 - <0.5        Recommended   0.1 - 0.25          Discouraged              Remeasure/reassess PCT  <0.1                Strongly Discouraged     Remeasure/reassess PCT    As 28 day mortality risk marker: \"Change in Procalcitonin Result\" (>80% or <=80%) if Day 0 (or Day 1) and Day 4 values are available. Refer to " http://www.[facility]-pct-calculator.com    Change in PCT <=80%  A decrease of PCT levels below or equal to 80% defines a positive change in PCT test result representing a higher risk for 28-day all-cause mortality of patients diagnosed with severe sepsis for septic shock.    Change in PCT >80%  A decrease of PCT levels of more than 80% defines a negative change in PCT result representing a lower risk for 28-day all-cause mortality of patients diagnosed with severe sepsis or septic shock.      MAGNESIUM - Normal   PHOSPHORUS - Normal   VALPROIC ACID LEVEL, TOTAL - Normal    Narrative:     Therapeutic Ranges for Valproic Acid    Epilepsy:       mcg/ml  Bipolar/Christiana  up to 125 mcg/ml     URINE CULTURE   LACTIC ACID, REFLEX   CBC AND DIFFERENTIAL    Narrative:     The following orders were created for panel order CBC & Differential.  Procedure                               Abnormality         Status                     ---------                               -----------         ------                     CBC Auto Differential[[id number]]        Abnormal            Final result                 Please view results for these tests on the individual orders.       EKG:   ECG 12 Lead Electrolyte Imbalance   Preliminary Result   HEART RATE=98  bpm   RR Igftydjs=516  ms   UT Puaqrren=843  ms   P Horizontal Axis=6  deg   P Front Axis=60  deg   QRSD Interval=79  ms   QT Rwmtwgbq=014  ms   VFwV=199  ms   QRS Axis=30  deg   T Wave Axis=28  deg   - NORMAL ECG -   Sinus rhythm   Date and Time of Study:2025-02-05 22:32:59          Meds given in ED:   Medications   sodium chloride 0.9 % flush 10 mL (has no administration in time range)   cefTRIAXone (ROCEPHIN) 1,000 mg in sodium chloride 0.9 % 100 mL MBP (has no administration in time range)   levETIRAcetam (KEPPRA) injection 1,000 mg (1,000 mg Intravenous Given 2/6/25 0031)       Imaging results:  CT Head Without Contrast    Result Date: 2/5/2025  No acute intracranial findings.   Radiation dose reduction techniques were utilized, including automated exposure control and exposure modulation based on body size.   This report was finalized on 2/5/2025 11:27 PM by Dr. Antoinette Samayoa M.D on Workstation: [email or web address]      XR Chest 1 View    Result Date: 2/5/2025  No acute findings.  This report was finalized on 2/5/2025 11:03 PM by Dr. Antoinette Samayoa M.D on Workstation: [email or web address]       Ambulatory status:   - 2 assist    Social issues:   Social History     Socioeconomic History    Marital status:    Tobacco Use    Smoking status: Never    Smokeless tobacco: Never   Substance and Sexual Activity    Alcohol use: Yes     Alcohol/week: 0.0 standard drinks of alcohol     Comment: 2-3/yr    Drug use: No       Peripheral Neurovascular       Neuro Cognitive       Learning       Respiratory       Abdominal Pain       Pain Assessments  Pain (Adult)  (0-10) Pain Rating: Rest: 0    NIH Stroke Scale       Bay Liu RN  02/06/25 01:17 EST

## 2025-02-06 NOTE — PLAN OF CARE
Goal Outcome Evaluation:  Plan of Care Reviewed With: patient, other (see comments) (daughter)        Progress: no change

## 2025-02-06 NOTE — H&P
Patient Name:  Tianna Walker  YOB: 1950  MRN:  [id number]  Admit Date:  2/5/2025  Patient Care Team:  Tony Chanel APRN as PCP - General (Nurse Practitioner)      Subjective   History Present Illness     Chief Complaint   Patient presents with    Neurologic Problem       History of Present Illness  Patient is a 74-year-old female with a history of seizures, HLD, rheumatoid arthritis,  Presented to the ED with weakness, fall, and altered mental status.  Per report patient had had recurrent falls, 1 fall with head injury with patient with patient fell forward over the walker.  Reportedly was confused.  When seen in the room patient was oriented to name, knew where she was in the hospital but could not tell the name, disoriented to year.  Unable to provide much history, when asked to describe the fall replies I fell.  Does follow commands.  Denies any pain when asked.  Patient was recently admitted to [facility] with UTI and anemia.  Patient's hemoglobin was 6.0 per report, received blood transfusion.  Patient was seen by GI, underwent EGD with no acute findings.  Patient was also evaluated by hematology, methotrexate might be contributing  which was held , and recommended outpatient surveillance lab.  Was also diagnosed with UTI, urine cultures that grew E. coli, patient received ceftriaxone IV and then discharged on Keflex x 5 days.  Patient was discharged on 01/20/2025.  Methotrexate was discontinued at discharge per ID summary chart  review.      Review of Systems     GENERAL: No fevers, chills, sweats.    RESPIRATORY: No cough, shortness of breath, hemoptysis or wheezing.   CVS: No chest pain, palpitations, orthopnea, dyspnea on exertion   GI: No melena or hematochezia. No abdominal pain. No nausea, vomiting, constipation, diarrhea      Personal History     Past Medical History:   Diagnosis Date    Allergic     Arthritis     Broken bones     DDD (degenerative disc disease),  lumbar 2/13/2020    Gallstones     Headache     History of major trauma     Injury of back     Injury of neck     Kidney stones     Low back pain     Macular degeneration     Osteopenia     Osteoporosis     Rheumatoid arthritis     Seizures     epilepsy    Seizures     Shortness of breath     Sleep apnea     c-pap     Past Surgical History:   Procedure Laterality Date    ANKLE SURGERY      APPENDECTOMY      BACK SURGERY      CHOLECYSTECTOMY      1968    KNEE SURGERY Bilateral     SHOULDER SURGERY      WRIST FRACTURE SURGERY       Family History   Problem Relation Age of Onset    Cancer Father      Social History     Tobacco Use    Smoking status: Never    Smokeless tobacco: Never   Vaping Use    Vaping status: Never Used   Substance Use Topics    Alcohol use: Yes     Alcohol/week: 0.0 standard drinks of alcohol     Comment: 2-3/yr    Drug use: No     No current facility-administered medications on file prior to encounter.     Current Outpatient Medications on File Prior to Encounter   Medication Sig Dispense Refill    BIOTIN PO Take  by mouth.      Cholecalciferol (VITAMIN D3) 2000 units tablet Take  by mouth.      folic acid (FOLVITE) 1 MG tablet       levETIRAcetam (KEPPRA) 500 MG tablet Take 2 tablets by mouth 2 (Two) Times a Day.      Multiple Vitamins-Minerals (ICAPS AREDS 2 PO) Take  by mouth.      predniSONE (DELTASONE) 1 MG tablet Take 1 tablet by mouth Daily.      albuterol sulfate  (90 Base) MCG/ACT inhaler Inhale 2 puffs Every 4 (Four) Hours As Needed for Wheezing or Shortness of Air. (Patient not taking: Reported on 2/6/2025) 18 g 0    benzonatate (Tessalon Perles) 100 MG capsule Take 1 capsule by mouth 3 (Three) Times a Day As Needed for Cough. 30 capsule 0    Calcium Carbonate-Vitamin D (CALCIUM + D PO) Take 1,200 mg by mouth.      clonazePAM (KlonoPIN) 0.5 MG tablet Take 0.5-1 tablets by mouth.      denosumab (PROLIA) 60 MG/ML solution syringe Inject  under the skin into the appropriate area as  directed.      dextromethorphan 15 MG/5ML syrup Take 10 mL by mouth 3 (Three) Times a Day As Needed for Cough. 118 mL 0    guaiFENesin (Mucinex) 600 MG 12 hr tablet Take 1 tablet by mouth 2 (Two) Times a Day. 20 tablet 0    methotrexate 2.5 MG tablet Take  by mouth.      triamcinolone (KENALOG) 0.1 % ointment Apply 1 application topically to the appropriate area as directed 2 (Two) Times a Day. 30 g 2     Allergies   Allergen Reactions    Aspirin GI Intolerance    Oxcarbazepine Swelling    Topamax [Topiramate] Itching    Celecoxib GI Intolerance    Codeine GI Intolerance    Diclofenac GI Intolerance    Fluticasone GI Intolerance    Lamictal [Lamotrigine] GI Intolerance    Metaxalone GI Intolerance       Objective    Objective     Vital Signs  Temp:  [98.2 °F (36.8 °C)-99.1 °F (37.3 °C)] 98.9 °F (37.2 °C)  Heart Rate:  [] 82  Resp:  [16-24] 16  BP: ()/(42-62) 117/53  SpO2:  [95 %-98 %] 98 %  on   ;   Device (Oxygen Therapy): room air  Body mass index is 37.93 kg/m².    Physical Exam  General:   Drowsy but arousable, confused, not following commands  HEENT: Normocephalic, atraumatic  Eyes: PERRL, EOMI, anicteric sclerae  Lungs: CTA, no wheezing, on room air  CV: Regular rate and rhythm, no murmurs rubs or gallops  Abdomen: Soft, nontender, nondistended.  Normoactive bowel sounds  Extremities: No significant peripheral edema  Skin: Clean/dry/intact, no rashes  Neuro: Oriented to name, place, not to year, no gross focal neurological deficits appreciated.  Slow to respond to questions.  Psych: Appropriate mood and affect    Results Review:  I reviewed the patient's new clinical results.  I reviewed the patient's new imaging results and agree with the interpretation.  I reviewed the patient's other test results and agree with the interpretation  I personally viewed and interpreted the patient's EKG/Telemetry data  Discussed with ED provider.    Lab Results (last 24 hours)       Procedure Component Value Units  Date/Time    POC Glucose Once [[id number]]  (Abnormal) Collected: 02/05/25 2218    Specimen: Blood Updated: 02/05/25 2219     Glucose 176 mg/dL     CBC & Differential [[id number]]  (Abnormal) Collected: 02/05/25 2227    Specimen: Blood Updated: 02/05/25 2238    Narrative:      The following orders were created for panel order CBC & Differential.  Procedure                               Abnormality         Status                     ---------                               -----------         ------                     CBC Auto Differential[[id number]]        Abnormal            Final result                 Please view results for these tests on the individual orders.    Comprehensive Metabolic Panel [[id number]]  (Abnormal) Collected: 02/05/25 2227    Specimen: Blood Updated: 02/05/25 2307     Glucose 182 mg/dL      BUN 11 mg/dL      Creatinine 0.91 mg/dL      Sodium 140 mmol/L      Potassium 4.2 mmol/L      Chloride 104 mmol/L      CO2 25.0 mmol/L      Calcium 8.9 mg/dL      Total Protein 7.0 g/dL      Albumin 3.0 g/dL      ALT (SGPT) 13 U/L      AST (SGOT) 31 U/L      Alkaline Phosphatase 92 U/L      Total Bilirubin 0.4 mg/dL      Globulin 4.0 gm/dL      A/G Ratio 0.8 g/dL      BUN/Creatinine Ratio 12.1     Anion Gap 11.0 mmol/L      eGFR 65.5 mL/min/1.73     Narrative:      GFR Categories in Chronic Kidney Disease (CKD)      GFR Category          GFR (mL/min/1.73)    Interpretation  G1                     90 or greater         Normal or high (1)  G2                      60-89                Mild decrease (1)  G3a                   45-59                Mild to moderate decrease  G3b                   30-44                Moderate to severe decrease  G4                    15-29                Severe decrease  G5                    14 or less           Kidney failure          (1)In the absence of evidence of kidney disease, neither GFR category G1 or G2 fulfill the criteria for CKD.    eGFR calculation 2021 CKD-EPI  creatinine equation, which does not include race as a factor    Protime-INR [[id number]]  (Abnormal) Collected: 02/05/25 2227    Specimen: Blood Updated: 02/05/25 2250     Protime 16.2 Seconds      INR 1.30    Lipase [[id number]]  (Abnormal) Collected: 02/05/25 2227    Specimen: Blood Updated: 02/05/25 2307     Lipase 11 U/L     BNP [[id number]]  (Normal) Collected: 02/05/25 2227    Specimen: Blood Updated: 02/05/25 2311     proBNP 693.0 pg/mL     Narrative:      This assay is used as an aid in the diagnosis of individuals suspected of having heart failure. It can be used as an aid in the diagnosis of acute decompensated heart failure (ADHF) in patients presenting with signs and symptoms of ADHF to the emergency department (ED). In addition, NT-proBNP of <300 pg/mL indicates ADHF is not likely.    Age Range Result Interpretation  NT-proBNP Concentration (pg/mL:      <50             Positive            >450                   Gray                 300-450                    Negative             <300    50-75           Positive            >900                  Gray                300-900                  Negative            <300      >75             Positive            >1800                  Gray                300-1800                  Negative            <300    High Sensitivity Troponin T [[id number]]  (Abnormal) Collected: 02/05/25 2227    Specimen: Blood Updated: 02/05/25 2311     HS Troponin T 18 ng/L     Narrative:      High Sensitive Troponin T Reference Range:  <14.0 ng/L- Negative Female for AMI  <22.0 ng/L- Negative Male for AMI  >=14 - Abnormal Female indicating possible myocardial injury.  >=22 - Abnormal Male indicating possible myocardial injury.   Clinicians would have to utilize clinical acumen, EKG, Troponin, and serial changes to determine if it is an Acute Myocardial Infarction or myocardial injury due to an underlying chronic condition.         Lactic Acid, Plasma [[id number]]  (Abnormal) Collected:  "02/05/25 2227    Specimen: Blood Updated: 02/05/25 2338     Lactate 2.5 mmol/L     Procalcitonin [[id number]]  (Normal) Collected: 02/05/25 2227    Specimen: Blood Updated: 02/05/25 2311     Procalcitonin 0.14 ng/mL     Narrative:      As a Marker for Sepsis (Non-Neonates):    1. <0.5 ng/mL represents a low risk of severe sepsis and/or septic shock.  2. >2 ng/mL represents a high risk of severe sepsis and/or septic shock.    As a Marker for Lower Respiratory Tract Infections that require antibiotic therapy:    PCT on Admission    Antibiotic Therapy       6-12 Hrs later    >0.5                Strongly Recommended  >0.25 - <0.5        Recommended   0.1 - 0.25          Discouraged              Remeasure/reassess PCT  <0.1                Strongly Discouraged     Remeasure/reassess PCT    As 28 day mortality risk marker: \"Change in Procalcitonin Result\" (>80% or <=80%) if Day 0 (or Day 1) and Day 4 values are available. Refer to http://[email or web address]-pct-calculator.com    Change in PCT <=80%  A decrease of PCT levels below or equal to 80% defines a positive change in PCT test result representing a higher risk for 28-day all-cause mortality of patients diagnosed with severe sepsis for septic shock.    Change in PCT >80%  A decrease of PCT levels of more than 80% defines a negative change in PCT result representing a lower risk for 28-day all-cause mortality of patients diagnosed with severe sepsis or septic shock.       Magnesium [[id number]]  (Normal) Collected: 02/05/25 2227    Specimen: Blood Updated: 02/05/25 2307     Magnesium 2.2 mg/dL     Phosphorus [[id number]]  (Normal) Collected: 02/05/25 2227    Specimen: Blood Updated: 02/05/25 2307     Phosphorus 2.8 mg/dL     CBC Auto Differential [[id number]]  (Abnormal) Collected: 02/05/25 2227    Specimen: Blood Updated: 02/05/25 2238     WBC 10.20 10*3/mm3      RBC 3.47 10*6/mm3      Hemoglobin 11.2 g/dL      Hematocrit 34.9 %      .6 fL      MCH 32.3 pg      MCHC 32.1 " g/dL      RDW 15.6 %      RDW-SD 55.4 fl      MPV 10.7 fL      Platelets 260 10*3/mm3      Neutrophil % 72.2 %      Lymphocyte % 8.9 %      Monocyte % 17.1 %      Eosinophil % 0.2 %      Basophil % 0.6 %      Immature Grans % 1.0 %      Neutrophils, Absolute 7.37 10*3/mm3      Lymphocytes, Absolute 0.91 10*3/mm3      Monocytes, Absolute 1.74 10*3/mm3      Eosinophils, Absolute 0.02 10*3/mm3      Basophils, Absolute 0.06 10*3/mm3      Immature Grans, Absolute 0.10 10*3/mm3      nRBC 0.0 /100 WBC     Valproic Acid Level, Total [[id number]]  (Normal) Collected: 02/05/25 2227    Specimen: Blood Updated: 02/05/25 2307     Valproic Acid 87.0 mcg/mL     Narrative:      Therapeutic Ranges for Valproic Acid    Epilepsy:       mcg/ml  Bipolar/Christiana  up to 125 mcg/ml      Respiratory Panel PCR w/COVID-19(SARS-CoV-2) MICHELLE/BRITTANY/HIRAL/PAD/COR/FÁTIMA In-House, NP Swab in UTM/VTM, 2 HR TAT - Swab, Nasopharynx [[id number]]  (Abnormal) Collected: 02/05/25 2230    Specimen: Swab from Nasopharynx Updated: 02/06/25 0004     ADENOVIRUS, PCR Not Detected     Coronavirus 229E Not Detected     Coronavirus HKU1 Not Detected     Coronavirus NL63 Not Detected     Coronavirus OC43 Not Detected     COVID19 Detected     Human Metapneumovirus Not Detected     Human Rhinovirus/Enterovirus Not Detected     Influenza A PCR Not Detected     Influenza B PCR Not Detected     Parainfluenza Virus 1 Not Detected     Parainfluenza Virus 2 Not Detected     Parainfluenza Virus 3 Not Detected     Parainfluenza Virus 4 Not Detected     RSV, PCR Not Detected     Bordetella pertussis pcr Not Detected     Bordetella parapertussis PCR Not Detected     Chlamydophila pneumoniae PCR Not Detected     Mycoplasma pneumo by PCR Not Detected    Narrative:      In the setting of a positive respiratory panel with a viral infection PLUS a negative procalcitonin without other underlying concern for bacterial infection, consider observing off antibiotics or discontinuation of  antibiotics and continue supportive care. If the respiratory panel is positive for atypical bacterial infection (Bordetella pertussis, Chlamydophila pneumoniae, or Mycoplasma pneumoniae), consider antibiotic de-escalation to target atypical bacterial infection.    Urinalysis With Culture If Indicated - Urine, Catheter [[id number]]  (Abnormal) Collected: 02/05/25 2342    Specimen: Urine, Catheter Updated: 02/06/25 0000     Color, UA Yellow     Appearance, UA Cloudy     pH, UA 8.0     Specific Gravity, UA 1.021     Glucose, UA Negative     Ketones, UA Trace     Bilirubin, UA Negative     Blood, UA Trace     Protein, UA 30 mg/dL (1+)     Leuk Esterase, UA Moderate (2+)     Nitrite, UA Negative     Urobilinogen, UA 0.2 E.U./dL    Narrative:      In absence of clinical symptoms, the presence of pyuria, bacteria, and/or nitrites on the urinalysis result does not correlate with infection.    Urinalysis, Microscopic Only - Urine, Catheter [[id number]]  (Abnormal) Collected: 02/05/25 2342    Specimen: Urine, Catheter Updated: 02/06/25 0023     RBC, UA 0-2 /HPF      WBC, UA Too Numerous to Count /HPF      Bacteria, UA None Seen /HPF      Squamous Epithelial Cells, UA 0-2 /HPF      Hyaline Casts, UA None Seen /LPF      Methodology Automated Microscopy    Urine Culture - Urine, Urine, Catheter [[id number]] Collected: 02/05/25 2342    Specimen: Urine, Catheter Updated: 02/06/25 0023    High Sensitivity Troponin T 1Hr [[id number]]  (Abnormal) Collected: 02/05/25 2346    Specimen: Blood Updated: 02/06/25 0016     HS Troponin T 18 ng/L      Troponin T Numeric Delta 0 ng/L      Troponin T % Delta 0    Narrative:      High Sensitive Troponin T Reference Range:  <14.0 ng/L- Negative Female for AMI  <22.0 ng/L- Negative Male for AMI  >=14 - Abnormal Female indicating possible myocardial injury.  >=22 - Abnormal Male indicating possible myocardial injury.   Clinicians would have to utilize clinical acumen, EKG, Troponin, and serial changes  to determine if it is an Acute Myocardial Infarction or myocardial injury due to an underlying chronic condition.         STAT Lactic Acid, Reflex [[id number]]  (Normal) Collected: 02/06/25 0124    Specimen: Blood Updated: 02/06/25 0150     Lactate 1.2 mmol/L     Vitamin B12 [[id number]]  (Abnormal) Collected: 02/06/25 0728    Specimen: Blood Updated: 02/06/25 0834     Vitamin B-12 1,710 pg/mL     Narrative:      Results may be falsely increased if patient taking Biotin.      TSH [[id number]]  (Abnormal) Collected: 02/06/25 0728    Specimen: Blood Updated: 02/06/25 0824     TSH 5.370 uIU/mL     Folate [[id number]]  (Normal) Collected: 02/06/25 0728    Specimen: Blood Updated: 02/06/25 0834     Folate >20.00 ng/mL     Narrative:      Results may be falsely increased if patient taking Biotin.      Comprehensive Metabolic Panel [[id number]]  (Abnormal) Collected: 02/06/25 0728    Specimen: Blood Updated: 02/06/25 0819     Glucose 120 mg/dL      BUN 11 mg/dL      Creatinine 0.79 mg/dL      Sodium 138 mmol/L      Potassium 4.4 mmol/L      Chloride 106 mmol/L      CO2 23.7 mmol/L      Calcium 8.4 mg/dL      Total Protein 6.4 g/dL      Albumin 2.7 g/dL      ALT (SGPT) 8 U/L      AST (SGOT) 31 U/L      Alkaline Phosphatase 85 U/L      Total Bilirubin 0.4 mg/dL      Globulin 3.7 gm/dL      A/G Ratio 0.7 g/dL      BUN/Creatinine Ratio 13.9     Anion Gap 8.3 mmol/L      eGFR 77.6 mL/min/1.73     Narrative:      GFR Categories in Chronic Kidney Disease (CKD)      GFR Category          GFR (mL/min/1.73)    Interpretation  G1                     90 or greater         Normal or high (1)  G2                      60-89                Mild decrease (1)  G3a                   45-59                Mild to moderate decrease  G3b                   30-44                Moderate to severe decrease  G4                    15-29                Severe decrease  G5                    14 or less           Kidney failure          (1)In the absence of  evidence of kidney disease, neither GFR category G1 or G2 fulfill the criteria for CKD.    eGFR calculation 2021 CKD-EPI creatinine equation, which does not include race as a factor    Magnesium [[id number]]  (Normal) Collected: 02/06/25 0728    Specimen: Blood Updated: 02/06/25 0819     Magnesium 2.1 mg/dL     Phosphorus [[id number]]  (Normal) Collected: 02/06/25 0728    Specimen: Blood Updated: 02/06/25 0835     Phosphorus 4.2 mg/dL     T4, Free [[id number]]  (Normal) Collected: 02/06/25 0728    Specimen: Blood Updated: 02/06/25 1445     Free T4 1.51 ng/dL     CBC & Differential [[id number]]  (Abnormal) Collected: 02/06/25 1237    Specimen: Blood Updated: 02/06/25 1331    Narrative:      The following orders were created for panel order CBC & Differential.  Procedure                               Abnormality         Status                     ---------                               -----------         ------                     CBC Auto Differential[[id number]]        Abnormal            Final result                 Please view results for these tests on the individual orders.    CBC Auto Differential [[id number]]  (Abnormal) Collected: 02/06/25 1237    Specimen: Blood Updated: 02/06/25 1331     WBC 9.39 10*3/mm3      RBC 2.69 10*6/mm3      Hemoglobin 8.4 g/dL      Hematocrit 27.4 %      .9 fL      MCH 31.2 pg      MCHC 30.7 g/dL      RDW 15.1 %      RDW-SD 55.5 fl      MPV 11.3 fL      Platelets 177 10*3/mm3      Neutrophil % 75.5 %      Lymphocyte % 12.4 %      Monocyte % 11.2 %      Eosinophil % 0.1 %      Basophil % 0.2 %      Immature Grans % 0.6 %      Neutrophils, Absolute 7.09 10*3/mm3      Lymphocytes, Absolute 1.16 10*3/mm3      Monocytes, Absolute 1.05 10*3/mm3      Eosinophils, Absolute 0.01 10*3/mm3      Basophils, Absolute 0.02 10*3/mm3      Immature Grans, Absolute 0.06 10*3/mm3      nRBC 0.0 /100 WBC             Imaging Results (Last 24 Hours)       Procedure Component Value Units Date/Time    CT  Head Without Contrast [[id number]] Collected: 02/05/25 2325     Updated: 02/05/25 2330    Narrative:      CT OF THE HEAD WITHOUT CONTRAST     HISTORY: Frequent falls and generalized weakness     COMPARISON: None available.     TECHNIQUE: Axial CT imaging was obtained through the brain. No IV  contrast was administered.     FINDINGS:  No acute intracranial hemorrhage is seen. The ventricles are normal in  size. There is no midline shift or mass effect. No calvarial fracture is  seen. Mucosal thickening is noted within the ethmoid sinuses. There are  asymmetric degenerative changes of the right TMJ. There is some trace  fluid within the right mastoid air cells.       Impression:      No acute intracranial findings.     Radiation dose reduction techniques were utilized, including automated  exposure control and exposure modulation based on body size.        This report was finalized on 2/5/2025 11:27 PM by Dr. Antoinette Samayoa M.D on Workstation: [id number]       XR Chest 1 View [[id number]] Collected: 02/05/25 2302     Updated: 02/05/25 2306    Narrative:      SINGLE VIEW OF THE CHEST     HISTORY: Altered mental status     COMPARISON: January 9, 2023     FINDINGS:  Heart size is within normal limits for technique and overall diminished  lung volumes. No pneumothorax, pleural effusion, or acute infiltrate is  seen.       Impression:      No acute findings.     This report was finalized on 2/5/2025 11:03 PM by Dr. Antoinette Samayoa M.D on Workstation: [id number]                   ECG 12 Lead Electrolyte Imbalance   Preliminary Result   HEART RATE=98  bpm   RR Oxsmbdsv=652  ms   SD Busewkqz=438  ms   P Horizontal Axis=6  deg   P Front Axis=60  deg   QRSD Interval=79  ms   QT Fejjylav=653  ms   FKoM=745  ms   QRS Axis=30  deg   T Wave Axis=28  deg   - NORMAL ECG -   Sinus rhythm   Date and Time of Study:2025-02-05 22:32:59      Telemetry Scan   Final Result           Assessment/Plan     Active Hospital Problems     Diagnosis  POA    **COVID-19 [U07.1]  Yes    Metabolic encephalopathy [G93.41]  Yes    Fall [W19.XXXA]  Yes    Anemia [D64.9]  Unknown    Rheumatoid arthritis [M06.9]  Yes    Seizures [R56.9]  Yes    Lymphedema [I89.0]  Yes      Resolved Hospital Problems   No resolved problems to display.       Patient is a 74-year-old female with a history of seizures, HLD, rheumatoid arthritis,  Presented to the ED with weakness, fall, and altered mental status.      COVID-19  -Chest x-ray with no acute findings.   -Respiratory panel positive for COVID-19  -Patient is on room air.  Unreliable history however replies no when asked if she has shortness of breath or or cough   -no indication for steroids currently  -Initiated on Paxlovid  -Will hold prophylactic prophylactic  anticoagulation secondary to anemia and hemoglobin trending down compared to admission.  No signs of bleeding reported., if hemoglobin remains stable tomorrow will consider initiation of Lovenox    Acute metabolic encephalopathy  - likely secondary to COVID and possible UTI  -CT head with no acute findings  -Neurological following, initiated on IV thiamine    History of seizures  Presentation not consistent with seizures per neurology  Continue outpatient Keppra 1500 mg twice daily, Depakote 250 mg twice daily       UTI  -Continue IV ceftriaxone.  Follow-up urine culture     RA  -Continue low-dose prednisone 1 mg daily  -Methotrexate recently discontinued at discharge due to possibly contributing to anemia    Anemia  -Recently admitted to [facility] on 01/17/2025 After labs ordered per PCP showed low hemoglobin.  Hemoglobin on admission to [facility] was 6.0.   -Patient received blood transfusion   -Patient was seen by GI, underwent EGD with no acute findings.  Patient was also evaluated by hematology, methotrexate might be contributing  which was held , and recommended outpatient surveillance lab.   -Hemoglobin at discharge was 8.5 on 01/20/2025.   Hemoglobin here on admission was 11.2 however suspect it likely was lab error, repeat hemoglobin was 8.4 which is similar to prior.  -Ordered repeat H&H to monitor  -Holding methotrexate      Abnormal thyroid function test  -TSH was mildly elevated at 4.3, however free T4 normal.  Will need repeat labs in 4-6 weeks.    I discussed the patient's findings and my recommendations with patient.    VTE Prophylaxis - SCDs.  Code Status - Full code.       Preston Blancas MD  [facility]  02/06/25  16:29 EST

## 2025-02-06 NOTE — PROGRESS NOTES
[facility] Clinical Pharmacy Services: Enoxaparin Consult    Tianna Walker has a pharmacy consult to dose prophylactic enoxaparin per CONRADO Stinson's request.     Indication: VTE Prophylaxis  Home Anticoagulation: None per med rec     Relevant clinical data and objective history reviewed:  74 y.o. female   94.1 kg (207 lb 6.4 oz)   Body mass index is 37.93 kg/m².   Results from last 7 days   Lab Units 02/05/25  2227   PLATELETS 10*3/mm3 260     CrCl cannot be calculated (Unknown ideal weight.).  Crcl ~ 35 ml/min calculated using CG equation. No height updated for Ideal Body weight.  Assessment/Plan    Will start patient on 40mg subcutaneous every 24 hours, adjusted for renal function. Consult order will be discontinued but pharmacy will continue to follow.     Lencho Simon [facility]  Clinical Pharmacist

## 2025-02-06 NOTE — ED PROVIDER NOTES
EMERGENCY DEPARTMENT ENCOUNTER    Room Number:  19/19  PCP: Tony Chanel APRN  Independent Historians: Patient and EMS    HPI:  Chief Complaint: altered mental status    A complete HPI/ROS/PMH/PSH/SH/FH are unobtainable due to: Poor historian    Chronic or social conditions impacting patient care (Social Determinants of Health): None      Context: Tianna Walker is a 74 y.o. female with a medical history of seizures, hyperlipidemia, lymphedema, rheumatoid arthritis who presents to the ED c/o acute generalized weakness and altered mental status. Pt with 2 falls yesterday and one today and did strike her head yesterday falling forward over her walker.  Pt was on potty pta and couldn't get up from potty due to generalized weakness. She seemed acutely altered and was only oriented to person initially and not following any commands. Patient's mental status has cleared some now but still not a good historian.        Review of prior external notes (non-ED) -and- Review of prior external test results outside of this encounter: I reviewed discharge summary from January 20 where patient was admitted at a [facility].  Patient was admitted for UTI and acute GI bleeding and required transfusion.  Admission hemoglobin was 6 and patient was seen by GI.  Patient had a urinary tract infection with urine cultures that grew E. coli so she received ceftriaxone IV and then discharged on Keflex x 5 days.  Patient is on Keppra and Depakote for her seizures.    Prescription drug monitoring program review:     N/A    PAST MEDICAL HISTORY  Active Ambulatory Problems     Diagnosis Date Noted   • Chronic cough 04/04/2016   • HLD (hyperlipidemia) 04/04/2016   • Arthritis or polyarthritis, rheumatoid 04/04/2016   • Osteoporosis 07/09/2013   • Scoliosis 04/04/2016   • Nonintractable generalized idiopathic epilepsy without status epilepticus 10/18/2018   • Lymphedema 10/18/2018   • Subclinical hypothyroidism 10/18/2018   • Chronic  low back pain 05/31/2019   • Morbid obesity with BMI of 40.0-44.9, adult 05/31/2019   • DDD (degenerative disc disease), lumbar 02/13/2020     Resolved Ambulatory Problems     Diagnosis Date Noted   • Bronchitis 05/12/2016     Past Medical History:   Diagnosis Date   • Allergic    • Arthritis    • Broken bones    • Gallstones    • Headache    • History of major trauma    • Injury of back    • Injury of neck    • Kidney stones    • Low back pain    • Macular degeneration    • Osteopenia    • Rheumatoid arthritis    • Seizures    • Seizures    • Shortness of breath    • Sleep apnea          PAST SURGICAL HISTORY  Past Surgical History:   Procedure Laterality Date   • ANKLE SURGERY     • APPENDECTOMY     • BACK SURGERY     • CHOLECYSTECTOMY      1968   • KNEE SURGERY Bilateral    • SHOULDER SURGERY     • WRIST FRACTURE SURGERY           FAMILY HISTORY  Family History   Problem Relation Age of Onset   • Cancer Father          SOCIAL HISTORY  Social History     Socioeconomic History   • Marital status:    Tobacco Use   • Smoking status: Never   • Smokeless tobacco: Never   Substance and Sexual Activity   • Alcohol use: Yes     Alcohol/week: 0.0 standard drinks of alcohol     Comment: 2-3/yr   • Drug use: No         ALLERGIES  Aspirin, Oxcarbazepine, Topamax [topiramate], Celecoxib, Codeine, Diclofenac, Fluticasone, Lamictal [lamotrigine], and Metaxalone        REVIEW OF SYSTEMS  Review of Systems  Included in HPI  All systems reviewed and negative except for those discussed in HPI.      PHYSICAL EXAM    I have reviewed the triage vital signs and nursing notes.    ED Triage Vitals   Temp Pulse Resp BP SpO2   -- -- -- -- --      Temp src Heart Rate Source Patient Position BP Location FiO2 (%)   -- -- -- -- --       Physical Exam  GENERAL: appropriate and alert, she is oriented X 4, she says she feels weak and short of breath, no acute distress  SKIN: Warm, dry  HENT: Normocephalic, atraumatic  EYES: no scleral  icterus, eomi  CV: regular rhythm, regular rate  RESPIRATORY: normal effort, diminished at the bases but no wheezing  ABDOMEN: soft, nontender, nondistended  MUSCULOSKELETAL: ble sig edema nonpitting  NEURO: alert and conversant, face symmetric, CN III-XII intact, no pronator drift, no clonus, grossly equal sensation to light touch all 4 extremities without deficit, no extinction, moves all extremities, follows commands      NIH: 0                                                             LAB RESULTS  Recent Results (from the past 24 hours)   POC Glucose Once    Collection Time: 02/05/25 10:18 PM    Specimen: Blood   Result Value Ref Range    Glucose 176 (H) 70 - 130 mg/dL   Comprehensive Metabolic Panel    Collection Time: 02/05/25 10:27 PM    Specimen: Blood   Result Value Ref Range    Glucose 182 (H) 65 - 99 mg/dL    BUN 11 8 - 23 mg/dL    Creatinine 0.91 0.57 - 1.00 mg/dL    Sodium 140 136 - 145 mmol/L    Potassium 4.2 3.5 - 5.2 mmol/L    Chloride 104 98 - 107 mmol/L    CO2 25.0 22.0 - 29.0 mmol/L    Calcium 8.9 8.6 - 10.5 mg/dL    Total Protein 7.0 6.0 - 8.5 g/dL    Albumin 3.0 (L) 3.5 - 5.2 g/dL    ALT (SGPT) 13 1 - 33 U/L    AST (SGOT) 31 1 - 32 U/L    Alkaline Phosphatase 92 39 - 117 U/L    Total Bilirubin 0.4 0.0 - 1.2 mg/dL    Globulin 4.0 gm/dL    A/G Ratio 0.8 g/dL    BUN/Creatinine Ratio 12.1 7.0 - 25.0    Anion Gap 11.0 5.0 - 15.0 mmol/L    eGFR 65.5 >60.0 mL/min/1.73   Protime-INR    Collection Time: 02/05/25 10:27 PM    Specimen: Blood   Result Value Ref Range    Protime 16.2 (H) 11.7 - 14.2 Seconds    INR 1.30 (H) 0.90 - 1.10   Lipase    Collection Time: 02/05/25 10:27 PM    Specimen: Blood   Result Value Ref Range    Lipase 11 (L) 13 - 60 U/L   BNP    Collection Time: 02/05/25 10:27 PM    Specimen: Blood   Result Value Ref Range    proBNP 693.0 0.0 - 1,800.0 pg/mL   High Sensitivity Troponin T    Collection Time: 02/05/25 10:27 PM    Specimen: Blood   Result Value Ref Range    HS Troponin T 18  (H) <14 ng/L   Procalcitonin    Collection Time: 02/05/25 10:27 PM    Specimen: Blood   Result Value Ref Range    Procalcitonin 0.14 0.00 - 0.25 ng/mL   Magnesium    Collection Time: 02/05/25 10:27 PM    Specimen: Blood   Result Value Ref Range    Magnesium 2.2 1.6 - 2.4 mg/dL   Phosphorus    Collection Time: 02/05/25 10:27 PM    Specimen: Blood   Result Value Ref Range    Phosphorus 2.8 2.5 - 4.5 mg/dL   CBC Auto Differential    Collection Time: 02/05/25 10:27 PM    Specimen: Blood   Result Value Ref Range    WBC 10.20 3.40 - 10.80 10*3/mm3    RBC 3.47 (L) 3.77 - 5.28 10*6/mm3    Hemoglobin 11.2 (L) 12.0 - 15.9 g/dL    Hematocrit 34.9 34.0 - 46.6 %    .6 (H) 79.0 - 97.0 fL    MCH 32.3 26.6 - 33.0 pg    MCHC 32.1 31.5 - 35.7 g/dL    RDW 15.6 (H) 12.3 - 15.4 %    RDW-SD 55.4 (H) 37.0 - 54.0 fl    MPV 10.7 6.0 - 12.0 fL    Platelets 260 140 - 450 10*3/mm3    Neutrophil % 72.2 42.7 - 76.0 %    Lymphocyte % 8.9 (L) 19.6 - 45.3 %    Monocyte % 17.1 (H) 5.0 - 12.0 %    Eosinophil % 0.2 (L) 0.3 - 6.2 %    Basophil % 0.6 0.0 - 1.5 %    Immature Grans % 1.0 (H) 0.0 - 0.5 %    Neutrophils, Absolute 7.37 (H) 1.70 - 7.00 10*3/mm3    Lymphocytes, Absolute 0.91 0.70 - 3.10 10*3/mm3    Monocytes, Absolute 1.74 (H) 0.10 - 0.90 10*3/mm3    Eosinophils, Absolute 0.02 0.00 - 0.40 10*3/mm3    Basophils, Absolute 0.06 0.00 - 0.20 10*3/mm3    Immature Grans, Absolute 0.10 (H) 0.00 - 0.05 10*3/mm3    nRBC 0.0 0.0 - 0.2 /100 WBC   Valproic Acid Level, Total    Collection Time: 02/05/25 10:27 PM    Specimen: Blood   Result Value Ref Range    Valproic Acid 87.0 50.0 - 125.0 mcg/mL   ECG 12 Lead Electrolyte Imbalance    Collection Time: 02/05/25 10:32 PM   Result Value Ref Range    QT Interval 331 ms    QTC Interval 423 ms         RADIOLOGY  CT Head Without Contrast    Result Date: 2/5/2025  CT OF THE HEAD WITHOUT CONTRAST  HISTORY: Frequent falls and generalized weakness  COMPARISON: None available.  TECHNIQUE: Axial CT imaging was  obtained through the brain. No IV contrast was administered.  FINDINGS: No acute intracranial hemorrhage is seen. The ventricles are normal in size. There is no midline shift or mass effect. No calvarial fracture is seen. Mucosal thickening is noted within the ethmoid sinuses. There are asymmetric degenerative changes of the right TMJ. There is some trace fluid within the right mastoid air cells.      No acute intracranial findings.  Radiation dose reduction techniques were utilized, including automated exposure control and exposure modulation based on body size.   This report was finalized on 2/5/2025 11:27 PM by Dr. Antoinette Samayoa M.D on Workstation: [id number]      XR Chest 1 View    Result Date: 2/5/2025  SINGLE VIEW OF THE CHEST  HISTORY: Altered mental status  COMPARISON: January 9, 2023  FINDINGS: Heart size is within normal limits for technique and overall diminished lung volumes. No pneumothorax, pleural effusion, or acute infiltrate is seen.      No acute findings.  This report was finalized on 2/5/2025 11:03 PM by Dr. Antoinette Samayoa M.D on Workstation: [id number]         MEDICATIONS GIVEN IN ER  Medications   sodium chloride 0.9 % flush 10 mL (has no administration in time range)   levETIRAcetam (KEPPRA) injection 1,000 mg (has no administration in time range)         ORDERS PLACED DURING THIS VISIT:  Orders Placed This Encounter   Procedures   • Respiratory Panel PCR w/COVID-19(SARS-CoV-2) MICHELLE/BRITTANY/HIRAL/PAD/COR/FÁTIMA In-House, NP Swab in UTM/VTM, 2 HR TAT - Swab, Nasopharynx   • XR Chest 1 View   • CT Head Without Contrast   • Comprehensive Metabolic Panel   • Protime-INR   • Lipase   • Urinalysis With Culture If Indicated - Urine, Catheter   • BNP   • High Sensitivity Troponin T   • Lactic Acid, Plasma   • Procalcitonin   • Magnesium   • Phosphorus   • CBC Auto Differential   • Valproic Acid Level, Total   • High Sensitivity Troponin T 1Hr   • POC Glucose Once   • ECG 12 Lead Electrolyte Imbalance    • Insert Peripheral IV   • CBC & Differential         OUTPATIENT MEDICATION MANAGEMENT:  Current Facility-Administered Medications Ordered in Epic   Medication Dose Route Frequency Provider Last Rate Last Admin   • levETIRAcetam (KEPPRA) injection 1,000 mg  1,000 mg Intravenous Once Lesley Bennett MD       • sodium chloride 0.9 % flush 10 mL  10 mL Intravenous PRN Lesley Bennett MD         Current Outpatient Medications Ordered in Epic   Medication Sig Dispense Refill   • albuterol sulfate  (90 Base) MCG/ACT inhaler Inhale 2 puffs Every 4 (Four) Hours As Needed for Wheezing or Shortness of Air. 18 g 0   • benzonatate (Tessalon Perles) 100 MG capsule Take 1 capsule by mouth 3 (Three) Times a Day As Needed for Cough. 30 capsule 0   • BIOTIN PO Take  by mouth.     • Calcium Carbonate-Vitamin D (CALCIUM + D PO) Take 1,200 mg by mouth.     • Cholecalciferol (VITAMIN D3) 2000 units tablet Take  by mouth.     • clonazePAM (KlonoPIN) 0.5 MG tablet Take 0.25-0.5 mg by mouth.     • denosumab (PROLIA) 60 MG/ML solution syringe Inject  under the skin into the appropriate area as directed.     • dextromethorphan 15 MG/5ML syrup Take 10 mL by mouth 3 (Three) Times a Day As Needed for Cough. 118 mL 0   • folic acid (FOLVITE) 1 MG tablet      • guaiFENesin (Mucinex) 600 MG 12 hr tablet Take 1 tablet by mouth 2 (Two) Times a Day. 20 tablet 0   • levETIRAcetam (KEPPRA) 500 MG tablet 500 mg 3 (Three) Times a Day.     • methotrexate 2.5 MG tablet Take  by mouth.     • Multiple Vitamins-Minerals (ICAPS AREDS 2 PO) Take  by mouth.     • predniSONE (DELTASONE) 1 MG tablet Take 1 mg by mouth Daily.     • triamcinolone (KENALOG) 0.1 % ointment Apply 1 application topically to the appropriate area as directed 2 (Two) Times a Day. 30 g 2         PROCEDURES  Procedures      Critical care provider statement:    Critical care time (minutes): 36.   Critical care time was exclusive of:  Separately billable procedures and  treating other patients   Critical care was necessary to treat or prevent imminent or life-threatening deterioration of the following conditions:  CNS Failure   Critical care was time spent personally by me on the following activities:  Development of treatment plan with patient or surrogate, discussions with consultants, evaluation of patient's response to treatment, examination of patient, obtaining history from patient or surrogate, ordering and performing treatments and interventions, ordering and review of laboratory studies, ordering and review of radiographic studies, pulse oximetry, re-evaluation of patient's condition and review of old charts. Critical Care indicators: Altered Mental Status, Elderly, Delirium       PROGRESS, DATA ANALYSIS, CONSULTS, AND MEDICAL DECISION MAKING  All labs have been independently interpreted by me.  All radiology studies have been reviewed by me. All EKG's have been independently viewed and interpreted by me.  Discussion below represents my analysis of pertinent findings related to patient's condition, differential diagnosis, treatment plan and final disposition.    This patient presents with altered mental status, generalized weakness, and complains of shortness of breath.  The differential is broad and includes metabolic disturbances (hyper/hyponatremia, hypercalcemia, hypo/hyperglycemia, renal failure with uremia), infection/sepsis, alcohol intoxication or withdrawal, hypoxia/hypercapnia, hepatic encephalopathy, hyper/hypothyroidism, adrenal insufficiency, seizure, trauma, ICH, CVA.  Given the broad differential, plan to monitor patient and obtain EKG, respiratory viral panel, serum labs to include electrolytes, cbc, lfts, troponin, valproic acid level, urine, CT head, and chest xray. Also may include ABG, LP, MRI, and antibiotic administration but will depend on vital signs, initial test results (anion gap etc), and clinical course.          Clinical Scores:               NIH 0    ED Course as of 02/06/25 0632   Wed Feb 05, 2025   2247 EKG ER MD interpretation   Time: 10: 30 2 PM  Rhythm and rate: Normal sinus rhythm at a rate of 98  Axis: Normal  P waves: Normal  QRS complexes: Normal  ST segments: no elevation nor depressions  T waves: no flattening or inversions  Comparison EKG is from September 2, 2014 [AR]   2248 I viewed patient's chest x-ray and on my interpretation patient is quite rotated with no pleural effusions [AR]   u Feb 06, 2025   0004 Patient was turned over to me by Dr. Bennett.  Pending: labs and admission   [CC]   0005 COVID19(!!): Detected [CC]   0024 WBC, UA(!): Too Numerous to Count [CC]   0024 Bacteria, UA: None Seen [CC]   0104 Spoke with ROOPA Douglas with A.  Reviewed history, exam, results, treatments.  She agrees admit the patient to Dr. Martinez.   [CC]      ED Course User Index  [AR] Lesley Bennett MD  [CC] Millie Sy PA-C             AS OF 23:34 EST VITALS:    BP - 137/59  HR - 109  TEMP - 98.5 °F (36.9 °C) (Tympanic)  O2 SATS - 96%      COMPLEXITY OF CARE  The patient requires admission.    DIAGNOSIS  Final diagnoses:   Generalized weakness   Altered mental status, unspecified altered mental status type   Lymphedema of both lower extremities         DISPOSITION  ED Disposition       ED Disposition   Intended Admit    Condition   --    Comment   --                Please note that portions of this document were completed with a voice recognition program.    Note Disclaimer: At [facility], we believe that sharing information builds trust and better relationships. You are receiving this note because you recently visited [facility]. It is possible you will see health information before a provider has talked with you about it. This kind of information can be easy to misunderstand. To help you fully understand what it means for your health, we urge you to discuss this note with your provider.         Lesley Bennett  MD  02/06/25 0632

## 2025-02-06 NOTE — CONSULTS
"Neurology Consult Note    Consult Date: 2/6/2025    Referring MD: Lesley Bennett MD    Reason for Consult I have been asked to see the patient in neurological consultation to render advice and opinion regarding altered mental status    Tianna Walker is a 74 y.o. female past medical history of epilepsy, hyperlipidemia, chronic lymphedema, rheumatoid arthritis presented to the ED 2/5/2025 with chief complaints of generalized weakness and altered mental status.  Per ED documentation patient had 2 falls yesterday.    He was recently at [facility] last month admitted for an acute UTI and acute GI bleeding required transfusions and was treated with ceftriaxone/Keflex for 5 days.    As per patient's daughter at bedside patient has been confused over the past couple of days although at baseline she is confused to an extent as per the daughter.  Daughter goes on to state that for the last 2 days he was having generalized weakness had trouble walking was having whole body tremulousness.      Patient follows up with [place] neurology Dr. Murcia for epilepsy, taken from her notes \" emiology of events:  (in childhood and early marriage had GTC, CPS). No wili seizures with LOC currently. She is experiencing very frequent auras Date of Onset: age 18 months 65 years ago Frequency: twice a week Triggers: missed dose Aura: currently she has an episodie that lasts 10 seconds \"feeling\" comes over her Prodrome: no Postictal: sometimes tired Time of day preponderance: no \"    Past Medical History:   Diagnosis Date    Allergic     Arthritis     Broken bones     DDD (degenerative disc disease), lumbar 2/13/2020    Gallstones     Headache     History of major trauma     Injury of back     Injury of neck     Kidney stones     Low back pain     Macular degeneration     Osteopenia     Osteoporosis     Rheumatoid arthritis     Seizures     epilepsy    Seizures     Shortness of breath     Sleep apnea     c-pap       Exam  BP " 112/49 (BP Location: Right arm, Patient Position: Lying)   Pulse 96   Temp 99.1 °F (37.3 °C) (Oral)   Resp 18   Wt 94.1 kg (207 lb 6.4 oz)   LMP  (LMP Unknown)   SpO2 97%   BMI 37.93 kg/m²   Gen: NAD, vitals reviewed  MS: Awake and alert able to answer questions and follow commands, oriented only to person pleasantly confused normal comprehension repetition fluency  CN: visual acuity grossly normal, PERRL, EOMI, no facial droop, no dysarthria  Motor: 5/5 throughout upper and lower extremities, normal tone    DATA:    Lab Results   Component Value Date    GLUCOSE 120 (H) 02/06/2025    CALCIUM 8.4 (L) 02/06/2025     02/06/2025    K 4.4 02/06/2025    CO2 23.7 02/06/2025     02/06/2025    BUN 11 02/06/2025    CREATININE 0.79 02/06/2025    EGFRIFAFRI 80 11/12/2019    EGFRIFNONA 81 11/30/2019    BCR 13.9 02/06/2025    ANIONGAP 8.3 02/06/2025     Lab Results   Component Value Date    WBC 10.20 02/05/2025    HGB 11.2 (L) 02/05/2025    HCT 34.9 02/05/2025    .6 (H) 02/05/2025     02/05/2025     Vitals  Systolic blood pressure 90s to 137  Afebrile  Rate 90s  Respiratory rate around 18    Lab review:   Sodium 138  Creatinine 0.79  Glucose 120  Normal LFTs    A1c 5.7  TSH 5.3  B12 1710  Folate 20    Lactate 2.5 on admission currently 1.2    WBC 10.2  hb 11.2 and platelets 260    Urinalysis showed trace ketones blood leukocytes protein WBC  Urine culture pending    Respiratory panel was positive for COVID    Imaging review:     CT head showed no acute hypodensity or hyperdensity    Diagnoses:  Acute COVID infection  Acute metabolic encephalopathy    Epilepsy on Keppra and Depakote  Hypertension  Hyperlipidemia  Recent urinary tract infection  Recent GI bleed    Pre-stroke MRS: 2    I believe this is most likely COVID encephalopathy no cranial nerve deficits or motor weakness to consider stroke under differential.  IV thiamine for 2 days  Continue home seizure medications 1500mg bid and depakote  250mg BID   I do not think this presentation is consistent with her seizure semiology and moreover her seizure medication within therapeutic levels  We will continue to follow        MDM   Reviewed: Previous charts, nursing notes and vitals   Reviewed: Previous labs and CT scan    Interpretation: Labs and CT scan   Total time providing care is :30-74 minutes. This excluded time spent performing separately reportable procedures and services  Consults :Neurology/Stroke    Please note that portions of this note were completed with a voice recognition program.     Nael Jain MD  Neuro Hospitalist /Vascular Neurology.

## 2025-02-06 NOTE — PLAN OF CARE
Problem: Adult Inpatient Plan of Care  Goal: Plan of Care Review  2/6/2025 0755 by Eduard Dela Cruz RN  Outcome: Progressing  2/6/2025 0754 by Eduard Dela Cruz RN  Outcome: Progressing  2/6/2025 0754 by Eduard Dela Cruz RN  Outcome: Progressing  Goal: Patient-Specific Goal (Individualized)  2/6/2025 0755 by Eduard Dela Cruz RN  Outcome: Progressing  2/6/2025 0754 by Eduard Dela Cruz RN  Outcome: Progressing  2/6/2025 0754 by Eduard Dela Cruz RN  Outcome: Progressing  Goal: Absence of Hospital-Acquired Illness or Injury  2/6/2025 0755 by Eduard Dela Cruz RN  Outcome: Progressing  2/6/2025 0754 by Eduard Dela Cruz RN  Outcome: Progressing  2/6/2025 0754 by Eduard Dela Cruz RN  Outcome: Progressing  Intervention: Identify and Manage Fall Risk  Recent Flowsheet Documentation  Taken 2/6/2025 0215 by Eduard Dela Cruz RN  Safety Promotion/Fall Prevention:   activity supervised   fall prevention program maintained   lighting adjusted   nonskid shoes/slippers when out of bed   safety round/check completed  Intervention: Prevent Skin Injury  Recent Flowsheet Documentation  Taken 2/6/2025 0215 by Eduard Dela Cruz RN  Body Position: weight shifting  Intervention: Prevent and Manage VTE (Venous Thromboembolism) Risk  Recent Flowsheet Documentation  Taken 2/6/2025 0215 by Eduard Dela Cruz RN  VTE Prevention/Management:   bilateral   SCDs (sequential compression devices) off  Goal: Optimal Comfort and Wellbeing  2/6/2025 0755 by Eduard Dela Cruz RN  Outcome: Progressing  2/6/2025 0754 by Eduard Dela Cruz RN  Outcome: Progressing  2/6/2025 0754 by Eduard Dela Cruz RN  Outcome: Progressing  Intervention: Monitor Pain and Promote Comfort  Recent Flowsheet Documentation  Taken 2/6/2025 0215 by Eduard Dela Cruz RN  Pain Management Interventions: no interventions per patient request  Intervention: Provide Person-Centered Care  Recent Flowsheet Documentation  Taken 2/6/2025 0215 by Eduard Dela Cruz RN  Trust Relationship/Rapport:   care explained    choices provided   questions answered   questions encouraged   thoughts/feelings acknowledged  Goal: Readiness for Transition of Care  2/6/2025 0755 by Eduard Dela Cruz RN  Outcome: Progressing  2/6/2025 0754 by Eduard Dela Cruz RN  Outcome: Progressing  2/6/2025 0754 by Eduard Dela Cruz RN  Outcome: Progressing  Intervention: Mutually Develop Transition Plan  Recent Flowsheet Documentation  Taken 2/6/2025 0444 by Eduard Dela Cruz RN  Equipment Currently Used at Home: walker, rolling  Taken 2/6/2025 0440 by Eduard Dela Cruz RN  Equipment Currently Used at Home: walker, rolling  Transportation Anticipated: family or friend will provide  Patient/Family Anticipated Services at Transition: none  Patient/Family Anticipates Transition to: home with family     Problem: Fall Injury Risk  Goal: Absence of Fall and Fall-Related Injury  2/6/2025 0755 by Eduard Dela Cruz RN  Outcome: Progressing  2/6/2025 0754 by Eduard Dela Cruz RN  Outcome: Progressing  2/6/2025 0754 by Eduard Dela Cruz RN  Outcome: Progressing  Intervention: Identify and Manage Contributors  Recent Flowsheet Documentation  Taken 2/6/2025 0215 by Eduard Dela Cruz RN  Medication Review/Management: medications reviewed  Intervention: Promote Injury-Free Environment  Recent Flowsheet Documentation  Taken 2/6/2025 0215 by Eduard Dela Cruz RN  Safety Promotion/Fall Prevention:   activity supervised   fall prevention program maintained   lighting adjusted   nonskid shoes/slippers when out of bed   safety round/check completed     Problem: Skin Injury Risk Increased  Goal: Skin Health and Integrity  2/6/2025 0755 by Eduard Dela Cruz RN  Outcome: Progressing  2/6/2025 0754 by Eduard Dela Cruz RN  Outcome: Progressing  2/6/2025 0754 by Eduard Dela Cruz RN  Outcome: Progressing  Intervention: Optimize Skin Protection  Recent Flowsheet Documentation  Taken 2/6/2025 0215 by Eduard Dela Cruz RN  Activity Management: activity encouraged  Pressure Reduction Techniques: weight shift  assistance provided  Head of Bed (HOB) Positioning: HOB at 20-30 degrees  Pressure Reduction Devices: positioning supports utilized   Goal Outcome Evaluation:   Plan of Care Reviewed with: patient, daughter

## 2025-02-07 LAB
ALBUMIN SERPL-MCNC: 2.2 G/DL (ref 3.5–5.2)
ALBUMIN/GLOB SERPL: 0.6 G/DL
ALP SERPL-CCNC: 67 U/L (ref 39–117)
ALT SERPL W P-5'-P-CCNC: 9 U/L (ref 1–33)
ANION GAP SERPL CALCULATED.3IONS-SCNC: 4.7 MMOL/L (ref 5–15)
AST SERPL-CCNC: 28 U/L (ref 1–32)
BILIRUB SERPL-MCNC: 0.2 MG/DL (ref 0–1.2)
BUN SERPL-MCNC: 12 MG/DL (ref 8–23)
BUN/CREAT SERPL: 17.4 (ref 7–25)
CALCIUM SPEC-SCNC: 7.9 MG/DL (ref 8.6–10.5)
CHLORIDE SERPL-SCNC: 107 MMOL/L (ref 98–107)
CO2 SERPL-SCNC: 26.3 MMOL/L (ref 22–29)
CREAT SERPL-MCNC: 0.69 MG/DL (ref 0.57–1)
DEPRECATED RDW RBC AUTO: 50.8 FL (ref 37–54)
EGFRCR SERPLBLD CKD-EPI 2021: 90.1 ML/MIN/1.73
ERYTHROCYTE [DISTWIDTH] IN BLOOD BY AUTOMATED COUNT: 14.9 % (ref 12.3–15.4)
GLOBULIN UR ELPH-MCNC: 3.7 GM/DL
GLUCOSE SERPL-MCNC: 82 MG/DL (ref 65–99)
HCT VFR BLD AUTO: 27.6 % (ref 34–46.6)
HGB BLD-MCNC: 9.1 G/DL (ref 12–15.9)
MAGNESIUM SERPL-MCNC: 1.9 MG/DL (ref 1.6–2.4)
MCH RBC QN AUTO: 31.8 PG (ref 26.6–33)
MCHC RBC AUTO-ENTMCNC: 33 G/DL (ref 31.5–35.7)
MCV RBC AUTO: 96.5 FL (ref 79–97)
PHOSPHATE SERPL-MCNC: 3.9 MG/DL (ref 2.5–4.5)
PLATELET # BLD AUTO: 168 10*3/MM3 (ref 140–450)
PMV BLD AUTO: 11.4 FL (ref 6–12)
POTASSIUM SERPL-SCNC: 4.4 MMOL/L (ref 3.5–5.2)
PROT SERPL-MCNC: 5.9 G/DL (ref 6–8.5)
RBC # BLD AUTO: 2.86 10*6/MM3 (ref 3.77–5.28)
SODIUM SERPL-SCNC: 138 MMOL/L (ref 136–145)
WBC NRBC COR # BLD AUTO: 8.06 10*3/MM3 (ref 3.4–10.8)

## 2025-02-07 PROCEDURE — 25010000002 CEFTRIAXONE PER 250 MG: Performed by: STUDENT IN AN ORGANIZED HEALTH CARE EDUCATION/TRAINING PROGRAM

## 2025-02-07 PROCEDURE — 97530 THERAPEUTIC ACTIVITIES: CPT

## 2025-02-07 PROCEDURE — 99233 SBSQ HOSP IP/OBS HIGH 50: CPT | Performed by: NURSE PRACTITIONER

## 2025-02-07 PROCEDURE — 25010000002 ENOXAPARIN PER 10 MG: Performed by: HOSPITALIST

## 2025-02-07 PROCEDURE — 63710000001 PREDNISONE PER 5 MG: Performed by: STUDENT IN AN ORGANIZED HEALTH CARE EDUCATION/TRAINING PROGRAM

## 2025-02-07 PROCEDURE — 97162 PT EVAL MOD COMPLEX 30 MIN: CPT

## 2025-02-07 PROCEDURE — 80053 COMPREHEN METABOLIC PANEL: CPT | Performed by: STUDENT IN AN ORGANIZED HEALTH CARE EDUCATION/TRAINING PROGRAM

## 2025-02-07 PROCEDURE — 83735 ASSAY OF MAGNESIUM: CPT | Performed by: STUDENT IN AN ORGANIZED HEALTH CARE EDUCATION/TRAINING PROGRAM

## 2025-02-07 PROCEDURE — 84100 ASSAY OF PHOSPHORUS: CPT | Performed by: STUDENT IN AN ORGANIZED HEALTH CARE EDUCATION/TRAINING PROGRAM

## 2025-02-07 PROCEDURE — 85027 COMPLETE CBC AUTOMATED: CPT | Performed by: STUDENT IN AN ORGANIZED HEALTH CARE EDUCATION/TRAINING PROGRAM

## 2025-02-07 PROCEDURE — 25010000002 THIAMINE HCL 200 MG/2ML SOLUTION 2 ML VIAL: Performed by: STUDENT IN AN ORGANIZED HEALTH CARE EDUCATION/TRAINING PROGRAM

## 2025-02-07 RX ORDER — FOLIC ACID 1 MG/1
1 TABLET ORAL DAILY
Status: DISCONTINUED | OUTPATIENT
Start: 2025-02-07 | End: 2025-02-09 | Stop reason: HOSPADM

## 2025-02-07 RX ORDER — ENOXAPARIN SODIUM 100 MG/ML
40 INJECTION SUBCUTANEOUS NIGHTLY
Status: DISCONTINUED | OUTPATIENT
Start: 2025-02-07 | End: 2025-02-09 | Stop reason: HOSPADM

## 2025-02-07 RX ORDER — CHOLECALCIFEROL (VITAMIN D3) 25 MCG
1000 TABLET ORAL DAILY
Status: DISCONTINUED | OUTPATIENT
Start: 2025-02-07 | End: 2025-02-09 | Stop reason: HOSPADM

## 2025-02-07 RX ORDER — VALPROIC ACID 250 MG/1
500 CAPSULE ORAL EVERY 12 HOURS SCHEDULED
Status: DISCONTINUED | OUTPATIENT
Start: 2025-02-07 | End: 2025-02-09 | Stop reason: HOSPADM

## 2025-02-07 RX ORDER — LEVETIRACETAM 500 MG/1
1000 TABLET ORAL 2 TIMES DAILY
Status: DISCONTINUED | OUTPATIENT
Start: 2025-02-07 | End: 2025-02-09 | Stop reason: HOSPADM

## 2025-02-07 RX ADMIN — CEFTRIAXONE SODIUM 1000 MG: 1 INJECTION, POWDER, FOR SOLUTION INTRAMUSCULAR; INTRAVENOUS at 00:04

## 2025-02-07 RX ADMIN — LEVETIRACETAM 1000 MG: 500 TABLET, FILM COATED ORAL at 20:27

## 2025-02-07 RX ADMIN — PREDNISONE 1 MG: 1 TABLET ORAL at 08:30

## 2025-02-07 RX ADMIN — ACETAMINOPHEN 650 MG: 325 TABLET, FILM COATED ORAL at 12:43

## 2025-02-07 RX ADMIN — CEFTRIAXONE SODIUM 1000 MG: 1 INJECTION, POWDER, FOR SOLUTION INTRAMUSCULAR; INTRAVENOUS at 23:16

## 2025-02-07 RX ADMIN — NIRMATRELVIR AND RITONAVIR 3 TABLET: KIT at 08:30

## 2025-02-07 RX ADMIN — LEVETIRACETAM 1500 MG: 500 TABLET, FILM COATED ORAL at 08:30

## 2025-02-07 RX ADMIN — NIRMATRELVIR AND RITONAVIR 3 TABLET: KIT at 20:27

## 2025-02-07 RX ADMIN — ENOXAPARIN SODIUM 40 MG: 100 INJECTION SUBCUTANEOUS at 20:27

## 2025-02-07 RX ADMIN — THIAMINE HYDROCHLORIDE 500 MG: 100 INJECTION, SOLUTION INTRAMUSCULAR; INTRAVENOUS at 08:30

## 2025-02-07 RX ADMIN — VALPROIC ACID 250 MG: 250 CAPSULE, LIQUID FILLED ORAL at 08:30

## 2025-02-07 RX ADMIN — FOLIC ACID 1 MG: 1 TABLET ORAL at 12:43

## 2025-02-07 RX ADMIN — VALPROIC ACID 500 MG: 250 CAPSULE, LIQUID FILLED ORAL at 20:27

## 2025-02-07 RX ADMIN — Medication 1000 UNITS: at 12:43

## 2025-02-07 NOTE — THERAPY EVALUATION
Patient Name: Tianna Walker  : 1950    MRN: [id number]                              Today's Date: 2025       Admit Date: 2025    Visit Dx:     ICD-10-CM ICD-9-CM   1. Generalized weakness  R53.1 780.79   2. Altered mental status, unspecified altered mental status type  R41.82 780.97   3. Lymphedema of both lower extremities  I89.0 457.1   4. Acute UTI  N39.0 599.0   5. COVID-19  U07.1 079.89     Patient Active Problem List   Diagnosis    Chronic cough    HLD (hyperlipidemia)    Arthritis or polyarthritis, rheumatoid    Osteoporosis    Scoliosis    Nonintractable generalized idiopathic epilepsy without status epilepticus    Lymphedema    Subclinical hypothyroidism    Chronic low back pain    Morbid obesity with BMI of 40.0-44.9, adult    DDD (degenerative disc disease), lumbar    COVID-19    Metabolic encephalopathy    Fall    Seizures    Anemia    Rheumatoid arthritis     Past Medical History:   Diagnosis Date    Allergic     Arthritis     Broken bones     DDD (degenerative disc disease), lumbar 2020    Gallstones     Headache     History of major trauma     Injury of back     Injury of neck     Kidney stones     Low back pain     Macular degeneration     Osteopenia     Osteoporosis     Rheumatoid arthritis     Seizures     epilepsy    Seizures     Shortness of breath     Sleep apnea     c-pap     Past Surgical History:   Procedure Laterality Date    ANKLE SURGERY      APPENDECTOMY      BACK SURGERY      CHOLECYSTECTOMY      1968    KNEE SURGERY Bilateral     SHOULDER SURGERY      WRIST FRACTURE SURGERY        General Information       Row Name 25          Physical Therapy Time and Intention    Document Type evaluation  -DJ     Mode of Treatment individual therapy;physical therapy  -DJ       Row Name 25 09          General Information    Patient Profile Reviewed yes  -DJ     Prior Level of Function independent:;ADL's;all household mobility  -DJ     Existing  Precautions/Restrictions fall  -DJ     Barriers to Rehab hearing deficit;previous functional deficit;cognitive status  -DJ       Row Name 02/07/25 0900          Living Environment    People in Home child(mauro), adult  -DJ       Row Name 02/07/25 0900          Home Main Entrance    Number of Stairs, Main Entrance none  -DJ       Row Name 02/07/25 0900          Stairs Within Home, Primary    Number of Stairs, Within Home, Primary one  -DJ       Row Name 02/07/25 0900          Cognition    Orientation Status (Cognition) oriented x 3  -DJ       Row Name 02/07/25 0900          Safety Issues/Impairments Affecting Functional Mobility    Safety Issues Affecting Function (Mobility) insight into deficits/self-awareness;safety precaution awareness;judgment;sequencing abilities  -DJ     Impairments Affecting Function (Mobility) endurance/activity tolerance;strength;cognition;shortness of breath  -DJ     Cognitive Impairments, Mobility Safety/Performance judgment;safety precaution awareness;sequencing abilities  -DJ     Comment, Safety Issues/Impairments (Mobility) gt belt, nonskid socks  -DJ               User Key  (r) = Recorded By, (t) = Taken By, (c) = Cosigned By      Initials Name Provider Type    Larisa Smith, PT Physical Therapist                   Mobility       Row Name 02/07/25 0901          Bed Mobility    Bed Mobility supine-sit;sit-supine  -DJ     Supine-Sit [place] (Bed Mobility) contact guard;minimum assist (75% patient effort);verbal cues  -DJ     Sit-Supine [place] (Bed Mobility) minimum assist (75% patient effort);verbal cues  -DJ     Assistive Device (Bed Mobility) head of bed elevated;leg   -     Comment, (Bed Mobility) min A to raise legs back into bed, slow processing  -       Row Name 02/07/25 0901          Transfers    Comment, (Transfers) sit/stand from EOB  -       Row Name 02/07/25 0901          Bed-Chair Transfer    Bed-Chair [place] (Transfers) not tested  -[facility]  Name 02/07/25 0901          Sit-Stand Transfer    Sit-Stand [place] (Transfers) contact guard;minimum assist (75% patient effort);verbal cues  -DJ     Assistive Device (Sit-Stand Transfers) walker, front-wheeled  -DJ       Row Name 02/07/25 0901          Gait/Stairs (Locomotion)    [place] Level (Gait) contact guard;minimum assist (75% patient effort);verbal cues  -DJ     Assistive Device (Gait) walker, front-wheeled  -DJ     Distance in Feet (Gait) 18  -DJ     Deviations/Abnormal Patterns (Gait) festinating/shuffling;gait speed decreased;stride length decreased;base of support, wide  -DJ     Bilateral Gait Deviations forward flexed posture  -DJ     [place] Level (Stairs) not tested  -DJ     Comment, (Gait/Stairs) Pt amb 18' in room with r wx and CGA - min A without O2; pace is slow, steps are shuffled and very tiny, balance is fair, endurance is poor. O2 94% after amb  -DJ               User Key  (r) = Recorded By, (t) = Taken By, (c) = Cosigned By      Initials Name Provider Type    Larisa Smith, PT Physical Therapist                   Obj/Interventions       Row Name 02/07/25 0905          Range of Motion Comprehensive    Comment, General Range of Motion grossly WFL  -DJ       Row Name 02/07/25 0905          Strength Comprehensive (MMT)    Comment, General Manual Muscle Testing (MMT) Assessment fair LE strength, generalized weakness  -DJ       Row Name 02/07/25 0905          Motor Skills    Motor Skills functional endurance  -DJ     Therapeutic Exercise other (see comments)  AP  -DJ       Row Name 02/07/25 0905          Balance    Balance Assessment standing static balance;standing dynamic balance  -DJ     Static Standing Balance contact guard;verbal cues  -DJ     Dynamic Standing Balance contact guard;minimal assist;verbal cues  -DJ     Position/Device Used, Standing Balance walker, front-wheeled;supported  -DJ     Balance Interventions sitting;standing;sit to stand;supported;weight shifting  activity  -DJ     Comment, Balance no LOB  -DJ       Row Name 02/07/25 0905          Sensory Assessment (Somatosensory)    Sensory Assessment (Somatosensory) not tested  -DJ               User Key  (r) = Recorded By, (t) = Taken By, (c) = Cosigned By      Initials Name Provider Type    Larisa Smith, PT Physical Therapist                   Goals/Plan       Row Name 02/07/25 0910          Bed Mobility Goal 1 (PT)    Activity/Assistive Device (Bed Mobility Goal 1, PT) sit to supine;supine to sit  -DJ     [place] Level/Cues Needed (Bed Mobility Goal 1, PT) modified independence;verbal cues required  -DJ     Time Frame (Bed Mobility Goal 1, PT) 10 days  -DJ       Row Name 02/07/25 0910          Transfer Goal 1 (PT)    Activity/Assistive Device (Transfer Goal 1, PT) sit-to-stand/stand-to-sit  -DJ     [place] Level/Cues Needed (Transfer Goal 1, PT) standby assist;verbal cues required  -DJ     Time Frame (Transfer Goal 1, PT) 10 days  -DJ       Row Name 02/07/25 0910          Gait Training Goal 1 (PT)    Activity/Assistive Device (Gait Training Goal 1, PT) gait (walking locomotion);assistive device use;improve balance and speed;increase endurance/gait distance;increase energy conservation;walker, rolling  -DJ     [place] Level (Gait Training Goal 1, PT) standby assist  -DJ     Distance (Gait Training Goal 1, PT) 150'  -DJ     Time Frame (Gait Training Goal 1, PT) 10 days  -DJ       Row Name 02/07/25 0910          Patient Education Goal (PT)    Activity (Patient Education Goal, PT) HEP  -DJ     [place]/Cues/Accuracy (Memory Goal 2, PT) demonstrates adequately;verbalizes understanding  -DJ     Time Frame (Patient Education Goal, PT) 5 days  -DJ       Row Name 02/07/25 0910          Therapy Assessment/Plan (PT)    Planned Therapy Interventions (PT) balance training;bed mobility training;gait training;home exercise program;strengthening;stair training;postural re-education;patient/family  education;transfer training  -DJ               User Key  (r) = Recorded By, (t) = Taken By, (c) = Cosigned By      Initials Name Provider Type    Larisa Smith, PT Physical Therapist                   Clinical Impression       Row Name 02/07/25 0906          Pain    Pretreatment Pain Rating 0/10 - no pain  -DJ       Row Name 02/07/25 0906          Plan of Care Review    Plan of Care Reviewed With patient  -DJ     Outcome Evaluation 73yo obese white female admitted 2/5/25 after 2 falls at home with weakness, found to be Covid (+). PMH Includes metabolic brain disorder, sz, anemia, RA, osteoporosis, L DDD. PLOF: Pt lives at home with es with no PAUL and 1 inside step. She reports she uses a wx for mobility adn was independent with ADLs. Today, she was found resting in bed in NAD, pleasant and cooperative. She req CGA - min A to sit EOB and to stand from EOB using r wx. Pt amb 18' in room with r wx and CGA - min A without O2; pace is slow, steps are shuffled and very tiny, balance is fair, endurance is poor. O2 94% after amb. Pt req min A to raise legs back into bed. She would certainly benefit from skilled PT servcies acutely to address functional deficits and prepare for d/c.  -DJ       Row Name 02/07/25 0906          Therapy Assessment/Plan (PT)    Patient/Family Therapy Goals Statement (PT) SNF vs home PT  -DJ     Rehab Potential (PT) good  -DJ     Criteria for Skilled Interventions Met (PT) yes;meets criteria;skilled treatment is necessary  -DJ     Therapy Frequency (PT) 6 times/wk  -DJ       Row Name 02/07/25 0906          Vital Signs    O2 Delivery Pre Treatment nasal cannula  -DJ     O2 Delivery Intra Treatment room air  -DJ     Post SpO2 (%) 94  -DJ     O2 Delivery Post Treatment room air  -DJ     Pre Patient Position Supine  -DJ     Intra Patient Position Standing  -DJ     Post Patient Position Supine  -DJ       Row Name 02/07/25 0906          Positioning and Restraints    Pre-Treatment Position in bed  -DJ      Post Treatment Position bed  -DJ     In Bed notified nsg;supine;call light within reach;encouraged to call for assist;exit alarm on;SCD pump applied;with nsg  -DJ               User Key  (r) = Recorded By, (t) = Taken By, (c) = Cosigned By      Initials Name Provider Type    Larisa Smith, BLANCA Physical Therapist                   Outcome Measures       Row Name 02/07/25 0911          How much help from another person do you currently need...    Turning from your back to your side while in flat bed without using bedrails? 3  -DJ     Moving from lying on back to sitting on the side of a flat bed without bedrails? 3  -DJ     Moving to and from a bed to a chair (including a wheelchair)? 3  -DJ     Standing up from a chair using your arms (e.g., wheelchair, bedside chair)? 3  -DJ     Climbing 3-5 steps with a railing? 2  -DJ     To walk in hospital room? 2  -DJ     AM-PAC 6 Clicks Score (PT) 16  -DJ     Highest Level of Mobility Goal 5 --> Static standing  -DJ       Row Name 02/07/25 0911          Functional Assessment    Outcome Measure Options AM-PAC 6 Clicks Basic Mobility (PT)  -DJ               User Key  (r) = Recorded By, (t) = Taken By, (c) = Cosigned By      Initials Name Provider Type    Larisa Smith, BLANCA Physical Therapist                                 Physical Therapy Education       Title: PT OT SLP Therapies (Done)       Topic: Physical Therapy (Done)       Point: Mobility training (Done)       Learning Progress Summary            Patient Acceptance, E, VU,NR by JACKIE at 2/7/2025 0912                      Point: Home exercise program (Done)       Learning Progress Summary            Patient Acceptance, E, VU,NR by JACKIE at 2/7/2025 0912                      Point: Body mechanics (Done)       Learning Progress Summary            Patient Acceptance, E, VU,NR by JACKIE at 2/7/2025 0912                      Point: Precautions (Done)       Learning Progress Summary            Patient Acceptance, E, VU,NR by JACKIE at  2/7/2025 0912                                      User Key       Initials Effective Dates Name Provider Type Discipline    DJ 10/25/19 -  Larisa Shepherd, PT Physical Therapist PT                  PT Recommendation and Plan  Planned Therapy Interventions (PT): balance training, bed mobility training, gait training, home exercise program, strengthening, stair training, postural re-education, patient/family education, transfer training  Outcome Evaluation: 75yo obese white female admitted 2/5/25 after 2 falls at home with weakness, found to be Covid (+). PMH Includes metabolic brain disorder, sz, anemia, RA, osteoporosis, L DDD. PLOF: Pt lives at home with es with no PAUL and 1 inside step. She reports she uses a wx for mobility adn was independent with ADLs. Today, she was found resting in bed in NAD, pleasant and cooperative. She req CGA - min A to sit EOB and to stand from EOB using r wx. Pt amb 18' in room with r wx and CGA - min A without O2; pace is slow, steps are shuffled and very tiny, balance is fair, endurance is poor. O2 94% after amb. Pt req min A to raise legs back into bed. She would certainly benefit from skilled PT servcies acutely to address functional deficits and prepare for d/c.     Time Calculation:   PT Evaluation Complexity  History, PT Evaluation Complexity: 3 or more personal factors and/or comorbidities  Examination of Body Systems (PT Eval Complexity): total of 4 or more elements  Clinical Presentation (PT Evaluation Complexity): evolving  Clinical Decision Making (PT Evaluation Complexity): moderate complexity  Overall Complexity (PT Evaluation Complexity): moderate complexity     PT Charges       Row Name 02/07/25 0918             Time Calculation    Start Time 0825  -DJ      Stop Time 0851  -DJ      Time Calculation (min) 26 min  -DJ      PT Non-Billable Time (min) 10 min  -DJ      PT Received On 02/07/25  -DJ      PT - Next Appointment 02/08/25  -DJ      PT Goal Re-Cert Due Date 02/17/25   -JACKIE                User Key  (r) = Recorded By, (t) = Taken By, (c) = Cosigned By      Initials Name Provider Type    Larisa Smith, PT Physical Therapist                  Therapy Charges for Today       Code Description Service Date Service Provider Modifiers Qty    47546620284 HC PT EVAL MOD COMPLEXITY 3 2/7/2025 Larisa Shepherd, PT GP 1    81964979568 HC PT THERAPEUTIC ACT EA 15 MIN 2/7/2025 Larisa Shepherd, PT GP 2            PT G-Codes  Outcome Measure Options: AM-PAC 6 Clicks Basic Mobility (PT)  AM-PAC 6 Clicks Score (PT): 16  PT Discharge Summary  Anticipated Discharge Disposition (PT): home with home health, skilled nursing facility (pending progress)    Larisa Shepherd PT  2/7/2025

## 2025-02-07 NOTE — PLAN OF CARE
Problem: Adult Inpatient Plan of Care  Goal: Absence of Hospital-Acquired Illness or Injury  Outcome: Progressing  Intervention: Identify and Manage Fall Risk  Recent Flowsheet Documentation  Taken 2/7/2025 0027 by Vitaliy Rizo RN  Safety Promotion/Fall Prevention:   safety round/check completed   fall prevention program maintained   clutter free environment maintained  Taken 2/6/2025 2225 by Vitaliy Rizo RN  Safety Promotion/Fall Prevention:   clutter free environment maintained   safety round/check completed   fall prevention program maintained  Taken 2/6/2025 2015 by Vitaliy Rizo RN  Safety Promotion/Fall Prevention: (Simultaneous filing. User may be unaware of other data.)   clutter free environment maintained   safety round/check completed   fall prevention program maintained  Intervention: Prevent Skin Injury  Recent Flowsheet Documentation  Taken 2/7/2025 0027 by Vitaliy Rizo RN  Body Position:   log-rolled   legs elevated   supine   upper extremity elevated  Skin Protection: incontinence pads utilized  Taken 2/6/2025 2225 by Vitaliy Rizo RN  Body Position:   position changed independently   right   tilted   legs elevated  Taken 2/6/2025 2015 by Vitaliy Rizo RN  Body Position:   turned   sitting up in bed  Skin Protection: incontinence pads utilized  Intervention: Prevent and Manage VTE (Venous Thromboembolism) Risk  Recent Flowsheet Documentation  Taken 2/7/2025 0027 by Vitaliy Rizo RN  VTE Prevention/Management:   SCDs (sequential compression devices) on   bilateral  Taken 2/6/2025 2015 by Vitaliy Rizo RN  VTE Prevention/Management:   SCDs (sequential compression devices) on   bilateral  Intervention: Prevent Infection  Recent Flowsheet Documentation  Taken 2/6/2025 2225 by Vitaliy Rizo RN  Infection Prevention: rest/sleep promoted  Goal: Optimal Comfort and Wellbeing  Outcome: Progressing  Intervention: Provide Person-Centered Care  Recent Flowsheet Documentation  Taken  2/7/2025 0027 by Vitaliy Rizo RN  Trust Relationship/Rapport:   care explained   questions encouraged   questions answered  Taken 2/6/2025 2015 by Vitaliy Rizo RN  Trust Relationship/Rapport:   care explained   empathic listening provided   questions answered     Problem: Fall Injury Risk  Goal: Absence of Fall and Fall-Related Injury  Outcome: Progressing  Intervention: Identify and Manage Contributors  Recent Flowsheet Documentation  Taken 2/7/2025 0027 by Vitaliy Rizo RN  Medication Review/Management: medications reviewed  Taken 2/6/2025 2015 by Vitaliy Rizo RN  Medication Review/Management: (Simultaneous filing. User may be unaware of other data.) medications reviewed  Intervention: Promote Injury-Free Environment  Recent Flowsheet Documentation  Taken 2/7/2025 0027 by Vitaliy Rizo RN  Safety Promotion/Fall Prevention:   safety round/check completed   fall prevention program maintained   clutter free environment maintained  Taken 2/6/2025 2225 by Vitaliy Rizo RN  Safety Promotion/Fall Prevention:   clutter free environment maintained   safety round/check completed   fall prevention program maintained  Taken 2/6/2025 2015 by Vitaliy Rizo RN  Safety Promotion/Fall Prevention: (Simultaneous filing. User may be unaware of other data.)   clutter free environment maintained   safety round/check completed   fall prevention program maintained     Problem: Skin Injury Risk Increased  Goal: Skin Health and Integrity  Outcome: Progressing  Intervention: Optimize Skin Protection  Recent Flowsheet Documentation  Taken 2/7/2025 0027 by Vitaliy Rizo RN  Pressure Reduction Techniques:   frequent weight shift encouraged   weight shift assistance provided  Head of Bed (HOB) Positioning: HOB at 20-30 degrees  Pressure Reduction Devices:   positioning supports utilized   alternating pressure pump (DEBORAH)  Skin Protection: incontinence pads utilized  Taken 2/6/2025 2225 by Vitaliy Rizo RN  Head of Bed (HOB)  Positioning: HOB at 30 degrees  Taken 2/6/2025 2015 by Vitaliy Rizo RN  Pressure Reduction Techniques:   frequent weight shift encouraged   weight shift assistance provided  Head of Bed (HOB) Positioning: HOB at 30 degrees  Pressure Reduction Devices:   alternating pressure pump (DEBORAH)   positioning supports utilized  Skin Protection: incontinence pads utilized     Problem: Violence Risk or Actual  Goal: Anger and Impulse Control  Outcome: Progressing  Intervention: Minimize Safety Risk  Recent Flowsheet Documentation  Taken 2/7/2025 0027 by Vitaliy Rizo RN  Sensory Stimulation Regulation:   lighting decreased   care clustered   quiet environment promoted  De-Escalation Techniques:   stimulation decreased   quiet time facilitated  Enhanced Safety Measures: bed alarm set  Taken 2/6/2025 2225 by Vitaliy Rizo RN  Enhanced Safety Measures: bed alarm set  Taken 2/6/2025 2015 by Vitaliy Rizo RN  Enhanced Safety Measures: (Simultaneous filing. User may be unaware of other data.) bed alarm set   Goal Outcome Evaluation:    Patient compliant with SCD placement to BLE. Q2 position changes tolerated with some self movement by the patient. Patient used call light without any attempts of getting out of bed alone and brief remained dry while on purewick.

## 2025-02-07 NOTE — PLAN OF CARE
Goal Outcome Evaluation:  Plan of Care Reviewed With: patient           Outcome Evaluation: 75yo obese white female admitted 2/5/25 after 2 falls at home with weakness, found to be Covid (+). PMH Includes metabolic brain disorder, sz, anemia, RA, osteoporosis, L DDD. PLOF: Pt lives at home with es with no PAUL and 1 inside step. She reports she uses a wx for mobility adn was independent with ADLs. Today, she was found resting in bed in NAD, pleasant and cooperative. She req CGA - min A to sit EOB and to stand from EOB using r wx. Pt amb 18' in room with r wx and CGA - min A without O2; pace is slow, steps are shuffled and very tiny, balance is fair, endurance is poor. O2 94% after amb. Pt req min A to raise legs back into bed. She would certainly benefit from skilled PT servcies acutely to address functional deficits and prepare for d/c.    Anticipated Discharge Disposition (PT): home with home health, skilled nursing facility (pending progress)

## 2025-02-07 NOTE — PROGRESS NOTES
Dedicated to Hospital Care    [phone]   LOS: 0 days     Name: Tianna Walker  Age/Sex: 74 y.o. female  :  1950        PCP: Tony Chanel APRN  Chief Complaint   Patient presents with    Neurologic Problem      Subjective   She is feeling okay today denies new issues or complaints.  Rested decently overnight.  Awake and alert.  Oriented x 2.  She remains pretty sleepy today.  General: No Fever or Chills, Cardiac: No Chest Pain or Palpitations, Resp: No Cough or SOA, GI: No Nausea, Vomiting, or Diarrhea, and Other: No bleeding    cefTRIAXone, 1,000 mg, Intravenous, Q24H  levETIRAcetam, 1,500 mg, Oral, BID  Nirmatrelvir & Ritonavir (300mg/100mg), 3 tablet, Oral, BID  predniSONE, 1 mg, Oral, Daily  valproic acid, 250 mg, Oral, Q12H           Objective   Vital Signs  Temp:  [98.1 °F (36.7 °C)-99.7 °F (37.6 °C)] 98.1 °F (36.7 °C)  Heart Rate:  [69-93] 69  Resp:  [16-18] 18  BP: (109-118)/(44-75) 109/52  Body mass index is 37.93 kg/m².    Intake/Output Summary (Last 24 hours) at 2025 1230  Last data filed at 2025 0455  Gross per 24 hour   Intake --   Output 800 ml   Net -800 ml       Physical Exam  Vitals reviewed.   Constitutional:       General: She is not in acute distress.     Appearance: She is ill-appearing.   Cardiovascular:      Rate and Rhythm: Normal rate and regular rhythm.   Pulmonary:      Effort: No respiratory distress.      Breath sounds: Normal breath sounds.   Neurological:      Mental Status: She is alert and oriented to person, place, and time. Mental status is at baseline.           Results Review:       I reviewed the patient's new clinical results.  Results from last 7 days   Lab Units 25  0727 25  1636 25  1237 25  2227   WBC 10*3/mm3 8.06  --  9.39 10.20   HEMOGLOBIN g/dL 9.1* 9.3* 8.4* 11.2*   PLATELETS 10*3/mm3 168  --  177 260     Results from last 7 days   Lab Units 25  0727 25  0728 25  2227   SODIUM mmol/L 138 138 140    POTASSIUM mmol/L 4.4 4.4 4.2   CHLORIDE mmol/L 107 106 104   CO2 mmol/L 26.3 23.7 25.0   BUN mg/dL 12 11 11   CREATININE mg/dL 0.69 0.79 0.91   CALCIUM mg/dL 7.9* 8.4* 8.9   MAGNESIUM mg/dL 1.9 2.1 2.2   PHOSPHORUS mg/dL 3.9 4.2 2.8   CrCl cannot be calculated (Unknown ideal weight.).      Assessment & Plan   Active Hospital Problems    Diagnosis  POA    **COVID-19 [U07.1]  Yes    Metabolic encephalopathy [G93.41]  Yes    Fall [W19.XXXA]  Yes    Anemia [D64.9]  Unknown    Rheumatoid arthritis [M06.9]  Yes    Seizures [R56.9]  Yes    Lymphedema [I89.0]  Yes      Resolved Hospital Problems   No resolved problems to display.       PLAN  Patient is a 74-year-old female with a history of seizures, HLD, rheumatoid arthritis, who presented to the ED with weakness, fall, and altered mental status and is found to have COVID-19 and possible urinary tract infection  -Appreciate neurology's evaluation.  Continue her current outpatient dosing with Keppra and Depakote.  Presentation not consistent with seizure or status.  -This is likely all metabolic related to underlying infectious processes including COVID-19 and urinary tract infection.  -Her urine cultures growing gram-positive cocci.  Continue to follow the sensitivities she seems to be responding to Rocephin which does raise the question whether this is a true infection or not.  Remains afebrile  -She remains on Paxlovid here in the hospital for her COVID-19 infection continue supportive care.  No need for steroids or supplemental oxygen at this time.  -Family frustrated that she is not receiving her vitamin D3 or folic acid.  These were ordered at their request  -Ambulated with physical therapy today.  Plan Home with home health versus skilled nursing facility depending on ongoing progress.  Will follow-up discharge planning's evaluation  -Will add subcu heparin for DVT prophylaxis  -Full code    Disposition  Expected discharge date/ time has not been documented.        José Luis Walter MD  [facility]  02/07/25  12:30 EST

## 2025-02-07 NOTE — PROGRESS NOTES
DOS: 2025  NAME: Tianna Walker   : 1950  PCP: Tony Chanel APRN    Chief Complaint   Patient presents with    Neurologic Problem         Subjective: Pt seen in follow up, however the problem is new to me.  Patient lying in bed initially asleep but easily arousable.  States she feels okay today.  Denies any new weakness, numbness, speech or visual disturbances, or headaches.  States she does not necessarily think she has been symptomatic from the COVID.    Objective:  Vital signs:   Vitals:    25 0027 25 0254 25 0818 25 1319   BP: 118/52 117/44 109/52 143/49   BP Location: Left arm Right arm Right arm Right arm   Patient Position: Lying Lying Lying Lying   Pulse: 76 81 69 78   Resp: 18 18 18 18   Temp: 99.7 °F (37.6 °C) 98.7 °F (37.1 °C) 98.1 °F (36.7 °C) 97.9 °F (36.6 °C)   TempSrc: Axillary Oral Oral Oral   SpO2: 91% 97% 95% 93%   Weight:           Current Facility-Administered Medications:     acetaminophen (TYLENOL) tablet 650 mg, 650 mg, Oral, Q4H PRN, Earlene Stnison APRN, 650 mg at 25 1243    sennosides-docusate (PERICOLACE) 8.6-50 MG per tablet 2 tablet, 2 tablet, Oral, BID PRN **AND** polyethylene glycol (MIRALAX) packet 17 g, 17 g, Oral, Daily PRN **AND** bisacodyl (DULCOLAX) EC tablet 5 mg, 5 mg, Oral, Daily PRN **AND** bisacodyl (DULCOLAX) suppository 10 mg, 10 mg, Rectal, Daily PRN, Earlene Stinson APRN    cefTRIAXone (ROCEPHIN) 1,000 mg in sodium chloride 0.9 % 100 mL MBP, 1,000 mg, Intravenous, Q24H, Preston Blancas MD, Stopped at 25 0035    cholecalciferol (VITAMIN D3) tablet 1,000 Units, 1,000 Units, Oral, Daily, José Luis Walter MD, 1,000 Units at 25 1243    Enoxaparin Sodium (LOVENOX) syringe 40 mg, 40 mg, Subcutaneous, Nightly, José Luis Walter MD    folic acid (FOLVITE) tablet 1 mg, 1 mg, Oral, Daily, José Luis Walter MD, 1 mg at 25 1243    levETIRAcetam (KEPPRA) tablet 1,500 mg, 1,500 mg, Oral, BID,  Nael Jain MD, 1,500 mg at 02/07/25 0830    nirmatrelvir and ritonavir (300mg/100mg)(PAXLOVID) 3 tablet pack, 3 tablet, Oral, BID, Earlene Stinson, APRN, 3 tablet at 02/07/25 0830    nitroglycerin (NITROSTAT) SL tablet 0.4 mg, 0.4 mg, Sublingual, Q5 Min PRN, Earlene Stinson APRN    ondansetron ODT (ZOFRAN-ODT) disintegrating tablet 4 mg, 4 mg, Oral, Q6H PRN **OR** ondansetron (ZOFRAN) injection 4 mg, 4 mg, Intravenous, Q6H PRN, Earlene Stinson APRN, 4 mg at 02/06/25 0631    predniSONE (DELTASONE) tablet 1 mg, 1 mg, Oral, Daily, Preston Blancas MD, 1 mg at 02/07/25 0830    [COMPLETED] Insert Peripheral IV, , , Once **AND** sodium chloride 0.9 % flush 10 mL, 10 mL, Intravenous, PRN, Lesley Bennett MD    valproic acid (DEPAKENE) capsule 250 mg, 250 mg, Oral, Q12H, Nael Jain MD, 250 mg at 02/07/25 0830    PRN meds    acetaminophen    senna-docusate sodium **AND** polyethylene glycol **AND** bisacodyl **AND** bisacodyl    nitroglycerin    ondansetron ODT **OR** ondansetron    [COMPLETED] Insert Peripheral IV **AND** sodium chloride    No current facility-administered medications on file prior to encounter.     Current Outpatient Medications on File Prior to Encounter   Medication Sig    BIOTIN PO Take  by mouth.    Cholecalciferol (VITAMIN D3) 2000 units tablet Take  by mouth.    folic acid (FOLVITE) 1 MG tablet     levETIRAcetam (KEPPRA) 500 MG tablet Take 2 tablets by mouth 2 (Two) Times a Day.    Multiple Vitamins-Minerals (ICAPS AREDS 2 PO) Take  by mouth.    predniSONE (DELTASONE) 1 MG tablet Take 1 tablet by mouth Daily.    albuterol sulfate  (90 Base) MCG/ACT inhaler Inhale 2 puffs Every 4 (Four) Hours As Needed for Wheezing or Shortness of Air. (Patient not taking: Reported on 2/6/2025)    benzonatate (Tessalon Perles) 100 MG capsule Take 1 capsule by mouth 3 (Three) Times a Day As Needed for Cough.    Calcium Carbonate-Vitamin D (CALCIUM + D  PO) Take 1,200 mg by mouth.    clonazePAM (KlonoPIN) 0.5 MG tablet Take 0.5-1 tablets by mouth.    denosumab (PROLIA) 60 MG/ML solution syringe Inject  under the skin into the appropriate area as directed.    dextromethorphan 15 MG/5ML syrup Take 10 mL by mouth 3 (Three) Times a Day As Needed for Cough.    guaiFENesin (Mucinex) 600 MG 12 hr tablet Take 1 tablet by mouth 2 (Two) Times a Day.    methotrexate 2.5 MG tablet Take  by mouth.    triamcinolone (KENALOG) 0.1 % ointment Apply 1 application topically to the appropriate area as directed 2 (Two) Times a Day.       General appearance: Elderly female, NAD, alert and cooperative  HEENT: Normocephalic, atraumatic    Neurological:   MS: oriented to person, year, and month, could not name the hospital name, normal attention/concentration, language intact, no neglect, normal fund of knowledge  CN: visual fields full, EOMI, facial sensation equal, no facial droop, shoulder shrug equal, tongue midline  Motor: 5/5 in all 4 ext.  Sensory: light touch sensation intact in all 4 ext.  Coordination: Normal finger to nose test  Gait and station: deferred   Rapid alternating movements: normal finger to thumb tap    Laboratory results:  Lab Results   Component Value Date    TSH 5.370 (H) 02/06/2025     Lab Results   Component Value Date    IHNRMKYS67 1,710 (H) 02/06/2025     Lab Results   Component Value Date    HGBA1C 5.7 (H) 04/17/2024     Lab Results   Component Value Date    GLUCOSE 82 02/07/2025    BUN 12 02/07/2025    CREATININE 0.69 02/07/2025    EGFRIFNONA 81 11/30/2019    EGFRIFAFRI 80 11/12/2019    BCR 17.4 02/07/2025    K 4.4 02/07/2025    CO2 26.3 02/07/2025    CALCIUM 7.9 (L) 02/07/2025    PROTENTOTREF 7.2 11/12/2019    ALBUMIN 2.2 (L) 02/07/2025    LABIL2 1.3 11/12/2019    AST 28 02/07/2025    ALT 9 02/07/2025     Lab Results   Component Value Date    WBC 8.06 02/07/2025    HGB 9.1 (L) 02/07/2025    HCT 27.6 (L) 02/07/2025    MCV 96.5 02/07/2025      "02/07/2025     Brief Urine Lab Results  (Last result in the past 365 days)        Color   Clarity   Blood   Leuk Est   Nitrite   Protein   CREAT   Urine HCG        02/05/25 2342 Yellow   Cloudy   Trace   Moderate (2+)   Negative   30 mg/dL (1+)                 No results found for: \"ACANTHNAEG\", \"AFBCX\", \"BPERTUSSISCX\", \"BLOODCX\"  No results found for: \"BCIDPCR\", \"CXREFLEX\", \"CSFCX\", \"CULTURETIS\"  No results found for: \"CULTURES\", \"HSVCX\", \"URCX\"  No results found for: \"EYECULTURE\", \"GCCX\", \"HSVCULTURE\", \"LABHSV\"  No results found for: \"LEGIONELLA\", \"MRSACX\", \"MUMPSCX\", \"MYCOPLASCX\"  No results found for: \"NOCARDIACX\", \"STOOLCX\"  Urine Culture   Date Value Ref Range Status   02/05/2025 >100,000 CFU/mL Gram Positive Cocci (A)  Preliminary     No results found for: \"VIRALCULTU\", \"WOUNDCX\"  Pain Management Panel           No data to display              Free T41.51  Folate >20.00  Lactate 2.5, repeat lactate 1.2  Valproic acid 87.0    Review and interpretation of imaging:  CT Head Without Contrast    Result Date: 2/5/2025  No acute intracranial findings.  Radiation dose reduction techniques were utilized, including automated exposure control and exposure modulation based on body size.   This report was finalized on 2/5/2025 11:27 PM by Dr. Antoinette Samayoa M.D on Workstation: [employer]      XR Chest 1 View    Result Date: 2/5/2025  No acute findings.  This report was finalized on 2/5/2025 11:03 PM by Dr. Antoinette Samayoa M.D on Workstation: [employer]         Impression/Assessment:  This is a 74-year-old female with past medical history of obstructive sleep apnea, seizure disorder on Keppra 1000 mg twice daily PTA, macular degeneration, rheumatoid arthritis who presented to the hospital on 2/5/2025 with complaints of generalized weakness and altered mental status.  Per chart review she was recently admitted to [facility] and was diagnosed with an acute UTI and GI bleeding requiring transfusions.  Her daughter felt " like she was more confused than usual over the last several days and has noticed she has been having trouble walking with whole body tremulousness and weakness.    Since admission she has been found to be COVID-positive and urine culture grew GPC.  She had a noncontrast CT head on admission that revealed no acute intracranial abnormalities.     Diagnosis:  Acute metabolic encephalopathy  COVID-19 positive  Urinary tract infection  Seizure disorder    Plan:  - She is awake and alert today. Followed all commands appropriately and was oriented x3 other than unable to name the hospital but knew she was at the hospital. No focal deficits on exam. CTH without acute processes.   - Given improvement of mentation will hold off on MRI brain.  Suspect toxic metabolic encephalopathy in the setting of COVID-19 and UTI.  - Continue supportive care for COVID and antibiotics for UTI per primary.  - She can continue her home dose AED therapy.  I reviewed her most recent lower Neurology note from 10/2024 and they document that the patient is supposed to be on Keppra 1000 mg twice daily which had recently been lowered from 1500 twice daily as well as Depakote 500 mg twice daily.  Will change that here to reflect her active med list.  We will sign off and see again per request.    Case discussed with patient and Dr. Jain, and he agrees with plan above.     FIDEL Hobson

## 2025-02-07 NOTE — PLAN OF CARE
VSS this shift. Pain well controlled per MAR. Pt ambulates to BSC with assistx1 with walker. Bed alarm on and call light in reach.

## 2025-02-08 LAB — BACTERIA SPEC AEROBE CULT: ABNORMAL

## 2025-02-08 PROCEDURE — 25010000002 ENOXAPARIN PER 10 MG: Performed by: HOSPITALIST

## 2025-02-08 PROCEDURE — 63710000001 PREDNISONE PER 5 MG: Performed by: STUDENT IN AN ORGANIZED HEALTH CARE EDUCATION/TRAINING PROGRAM

## 2025-02-08 PROCEDURE — 97530 THERAPEUTIC ACTIVITIES: CPT

## 2025-02-08 PROCEDURE — 25010000002 CEFTRIAXONE PER 250 MG: Performed by: STUDENT IN AN ORGANIZED HEALTH CARE EDUCATION/TRAINING PROGRAM

## 2025-02-08 RX ADMIN — ENOXAPARIN SODIUM 40 MG: 100 INJECTION SUBCUTANEOUS at 21:42

## 2025-02-08 RX ADMIN — LEVETIRACETAM 1000 MG: 500 TABLET, FILM COATED ORAL at 09:19

## 2025-02-08 RX ADMIN — PREDNISONE 1 MG: 1 TABLET ORAL at 09:19

## 2025-02-08 RX ADMIN — NIRMATRELVIR AND RITONAVIR 3 TABLET: KIT at 21:42

## 2025-02-08 RX ADMIN — VALPROIC ACID 500 MG: 250 CAPSULE, LIQUID FILLED ORAL at 21:42

## 2025-02-08 RX ADMIN — LEVETIRACETAM 1000 MG: 500 TABLET, FILM COATED ORAL at 21:42

## 2025-02-08 RX ADMIN — CEFTRIAXONE SODIUM 1000 MG: 1 INJECTION, POWDER, FOR SOLUTION INTRAMUSCULAR; INTRAVENOUS at 23:36

## 2025-02-08 RX ADMIN — VALPROIC ACID 500 MG: 250 CAPSULE, LIQUID FILLED ORAL at 09:19

## 2025-02-08 RX ADMIN — FOLIC ACID 1 MG: 1 TABLET ORAL at 09:19

## 2025-02-08 RX ADMIN — ACETAMINOPHEN 650 MG: 325 TABLET, FILM COATED ORAL at 13:48

## 2025-02-08 RX ADMIN — NIRMATRELVIR AND RITONAVIR 3 TABLET: KIT at 09:19

## 2025-02-08 RX ADMIN — Medication 1000 UNITS: at 09:19

## 2025-02-08 NOTE — PLAN OF CARE
One Week Followup Phone Call After your Transcatheter Valve Procedure    Date of Discharge:  3/14/23  Procedure Performed:   29 mm Scarlett 3 Ultra Resilia valve +perc 3/13/23 with Dr. Jaramillo and Dr. Javier  Anticoagulant/Antiplatelets ordered at D/C (discharge):  ASA      Activity:  Walking 4-6x/day  Incision:  WNL. Washing daily.   SOB (shortness of breath)/Edema/Changes in weight:  Denies SOB, edema. Weight stable  Questions about medications:  Denies        Comments/Questions:  Denies    Recommendations:  Call us with questions or concerns.  Follow up as scheduled in one month with an ECHO.   VSS this shift. Pain well controlled per MAR. Patient up to chair part of shift and ambulated to bathroom SB assist multiple times this shift. No complaints at this time. Bed alarm on and call light in reach.

## 2025-02-08 NOTE — THERAPY TREATMENT NOTE
Patient Name: Tianna Walker  : 1950    MRN: [id number]                              Today's Date: 2025       Admit Date: 2025    Visit Dx:     ICD-10-CM ICD-9-CM   1. Generalized weakness  R53.1 780.79   2. Altered mental status, unspecified altered mental status type  R41.82 780.97   3. Lymphedema of both lower extremities  I89.0 457.1   4. Acute UTI  N39.0 599.0   5. COVID-19  U07.1 079.89     Patient Active Problem List   Diagnosis    Chronic cough    HLD (hyperlipidemia)    Arthritis or polyarthritis, rheumatoid    Osteoporosis    Scoliosis    Nonintractable generalized idiopathic epilepsy without status epilepticus    Lymphedema    Subclinical hypothyroidism    Chronic low back pain    Morbid obesity with BMI of 40.0-44.9, adult    DDD (degenerative disc disease), lumbar    COVID-19    Metabolic encephalopathy    Fall    Seizures    Anemia    Rheumatoid arthritis     Past Medical History:   Diagnosis Date    Allergic     Arthritis     Broken bones     DDD (degenerative disc disease), lumbar 2020    Gallstones     Headache     History of major trauma     Injury of back     Injury of neck     Kidney stones     Low back pain     Macular degeneration     Osteopenia     Osteoporosis     Rheumatoid arthritis     Seizures     epilepsy    Seizures     Shortness of breath     Sleep apnea     c-pap     Past Surgical History:   Procedure Laterality Date    ANKLE SURGERY      APPENDECTOMY      BACK SURGERY      CHOLECYSTECTOMY      1968    KNEE SURGERY Bilateral     SHOULDER SURGERY      WRIST FRACTURE SURGERY        General Information       Row Name 25 1512          Physical Therapy Time and Intention    Document Type therapy note (daily note)  -MP     Mode of Treatment physical therapy;individual therapy  -MP       Row Name 25 1512          General Information    Patient Profile Reviewed yes  -MP     Existing Precautions/Restrictions fall  -MP       Row Name 25 1519           Cognition    Orientation Status (Cognition) oriented x 3  -MP       Row Name 02/08/25 1512          Safety Issues/Impairments Affecting Functional Mobility    Impairments Affecting Function (Mobility) endurance/activity tolerance;strength;cognition;shortness of breath  -MP               User Key  (r) = Recorded By, (t) = Taken By, (c) = Cosigned By      Initials Name Provider Type    Jerilyn Agosto, PT Physical Therapist                   Mobility       Row Name 02/08/25 1512          Bed Mobility    Comment, (Bed Mobility) UIC  -MP       Row Name 02/08/25 1512          Sit-Stand Transfer    Sit-Stand [place] (Transfers) verbal cues;minimum assist (75% patient effort);1 person assist  -MP     Assistive Device (Sit-Stand Transfers) walker, front-wheeled  -MP       Row Name 02/08/25 1512          Gait/Stairs (Locomotion)    [place] Level (Gait) contact guard;verbal cues;1 person assist  -MP     Assistive Device (Gait) walker, front-wheeled  -MP     Patient was able to Ambulate yes  -MP     Distance in Feet (Gait) 15  -MP     Deviations/Abnormal Patterns (Gait) festinating/shuffling;gait speed decreased;stride length decreased;base of support, wide  -MP               User Key  (r) = Recorded By, (t) = Taken By, (c) = Cosigned By      Initials Name Provider Type    Jerilyn Agosto PT Physical Therapist                   Obj/Interventions       Row Name 02/08/25 1512          Motor Skills    Therapeutic Exercise other (see comments)  seated ramsey, LARHINA, HR/TR x10 each  -MP               User Key  (r) = Recorded By, (t) = Taken By, (c) = Cosigned By      Initials Name Provider Type    Jerilyn Agosto PT Physical Therapist                   Goals/Plan    No documentation.                  Clinical Impression       Row Name 02/08/25 1513          Pain    Pretreatment Pain Rating 0/10 - no pain  -MP     Posttreatment Pain Rating 0/10 - no pain  -MP       Row Name 02/08/25 1513          Plan of Care  Review    Plan of Care Reviewed With patient  -MP     Outcome Evaluation Pt. Agreeable to PT this date. Required minAx1 for STS, and CGA for ambulation for 15' with rwx. Pt. Demonstrated slow but steady gait speed with postural lean to R. Performed seated marches, LAQ, HR/TR x10 each. Anticipate D/C home with HH PT when appropriate. Pt. Remains appropriate for skilled PT acutely.  -MP       Row Name 02/08/25 1513          Therapy Assessment/Plan (PT)    Rehab Potential (PT) good  -MP     Criteria for Skilled Interventions Met (PT) yes;meets criteria;skilled treatment is necessary  -MP     Therapy Frequency (PT) 6 times/wk  -MP       Row Name 02/08/25 1513          Positioning and Restraints    Pre-Treatment Position sitting in chair/recliner  -MP     Post Treatment Position chair  -MP     In Chair reclined;sitting;call light within reach;encouraged to call for assist;exit alarm on;with family/caregiver  -MP               User Key  (r) = Recorded By, (t) = Taken By, (c) = Cosigned By      Initials Name Provider Type    Jerilyn Agosto, PT Physical Therapist                   Outcome Measures       Row Name 02/08/25 1514 02/08/25 0802       How much help from another person do you currently need...    Turning from your back to your side while in flat bed without using bedrails? 3  -MP 3  -EB    Moving from lying on back to sitting on the side of a flat bed without bedrails? 3  -MP 3  -EB    Moving to and from a bed to a chair (including a wheelchair)? 3  -MP 3  -EB    Standing up from a chair using your arms (e.g., wheelchair, bedside chair)? 3  -MP 3  -EB    Climbing 3-5 steps with a railing? 2  -MP 2  -EB    To walk in hospital room? 3  -MP 3  -EB    AM-PAC 6 Clicks Score (PT) 17  -MP 17  -EB    Highest Level of Mobility Goal 5 --> Static standing  -MP 5 --> Static standing  -EB      Row Name 02/08/25 1514          Functional Assessment    Outcome Measure Options AM-PAC 6 Clicks Basic Mobility (PT)  -MP                User Key  (r) = Recorded By, (t) = Taken By, (c) = Cosigned By      Initials Name Provider Type    MP Jerilyn Manuel, PT Physical Therapist    Betty Montelongo, RN Registered Nurse                                 Physical Therapy Education       Title: PT OT SLP Therapies (Done)       Topic: Physical Therapy (Done)       Point: Mobility training (Done)       Learning Progress Summary            Patient Acceptance, E,TB,D, VU,NR,DU by  at 2/8/2025 1514    Acceptance, E, VU,NR by  at 2/7/2025 0912                      Point: Home exercise program (Done)       Learning Progress Summary            Patient Acceptance, E,TB,D, VU,NR,DU by  at 2/8/2025 1514    Acceptance, E, VU,NR by  at 2/7/2025 0912                      Point: Body mechanics (Done)       Learning Progress Summary            Patient Acceptance, E, VU,NR by  at 2/7/2025 0912                      Point: Precautions (Done)       Learning Progress Summary            Patient Acceptance, E, VU,NR by  at 2/7/2025 0912                                      User Key       Initials Effective Dates Name Provider Type Discipline     10/25/19 -  Larisa Shepherd, PT Physical Therapist PT     02/07/24 -  Jerilyn Manuel, PT Physical Therapist PT                  PT Recommendation and Plan     Outcome Evaluation: Pt. Agreeable to PT this date. Required minAx1 for STS, and CGA for ambulation for 15' with rwx. Pt. Demonstrated slow but steady gait speed with postural lean to R. Performed seated marches, LAQ, HR/TR x10 each. Anticipate D/C home with HH PT when appropriate. Pt. Remains appropriate for skilled PT acutely.     Time Calculation:         PT Charges       Row Name 02/08/25 1515             Time Calculation    Start Time 1410  -MP      Stop Time 1421  -MP      Time Calculation (min) 11 min  -MP      PT Received On 02/08/25  -      PT - Next Appointment 02/10/25  -         Time Calculation- PT    Total Timed Code Minutes- PT 11 minute(s)   -MP         Timed Charges    59529 - PT Therapeutic Exercise Minutes 4  -MP      14100 - PT Therapeutic Activity Minutes 7  -MP         Total Minutes    Timed Charges Total Minutes 11  -MP       Total Minutes 11  -MP                User Key  (r) = Recorded By, (t) = Taken By, (c) = Cosigned By      Initials Name Provider Type    Jerilyn Agosto, PT Physical Therapist                  Therapy Charges for Today       Code Description Service Date Service Provider Modifiers Qty    [id number] HC PT THERAPEUTIC ACT EA 15 MIN 2/8/2025 Jerilyn Manuel, BLANCA GP 1            PT G-Codes  Outcome Measure Options: AM-PAC 6 Clicks Basic Mobility (PT)  AM-PAC 6 Clicks Score (PT): 17  PT Discharge Summary  Anticipated Discharge Disposition (PT): home with home health    Jerilyn Manuel PT  2/8/2025

## 2025-02-08 NOTE — PROGRESS NOTES
Dedicated to Hospital Care    [phone]   LOS: 1 day     Name: Tianna Walker  Age/Sex: 74 y.o. female  :  1950        PCP: Tony Chanel APRN  Chief Complaint   Patient presents with    Neurologic Problem      Subjective   She is feeling okay today denies new issues or complaints.  Rested decently overnight.  Awake and alert.  Oriented x 2.  More awake and out of bed this AM  General: No Fever or Chills, Cardiac: No Chest Pain or Palpitations, Resp: No Cough or SOA, GI: No Nausea, Vomiting, or Diarrhea, and Other: No bleeding    cefTRIAXone, 1,000 mg, Intravenous, Q24H  cholecalciferol, 1,000 Units, Oral, Daily  enoxaparin, 40 mg, Subcutaneous, Nightly  folic acid, 1 mg, Oral, Daily  levETIRAcetam, 1,000 mg, Oral, BID  Nirmatrelvir & Ritonavir (300mg/100mg), 3 tablet, Oral, BID  predniSONE, 1 mg, Oral, Daily  valproic acid, 500 mg, Oral, Q12H           Objective   Vital Signs  Temp:  [97.3 °F (36.3 °C)-98.1 °F (36.7 °C)] 98 °F (36.7 °C)  Heart Rate:  [70-78] 70  Resp:  [18-20] 18  BP: (108-143)/() 108/59  Body mass index is 37.93 kg/m².  No intake or output data in the 24 hours ending 25 1227      Physical Exam  Vitals reviewed.   Constitutional:       General: She is not in acute distress.     Appearance: She is ill-appearing.   Cardiovascular:      Rate and Rhythm: Normal rate and regular rhythm.   Pulmonary:      Effort: No respiratory distress.      Breath sounds: Normal breath sounds.   Neurological:      Mental Status: She is alert and oriented to person, place, and time. Mental status is at baseline.           Results Review:       I reviewed the patient's new clinical results.  Results from last 7 days   Lab Units 25  0727 25  1636 25  1237 25  2227   WBC 10*3/mm3 8.06  --  9.39 10.20   HEMOGLOBIN g/dL 9.1* 9.3* 8.4* 11.2*   PLATELETS 10*3/mm3 168  --  177 260     Results from last 7 days   Lab Units 25  0727 25  0728 25  1820    SODIUM mmol/L 138 138 140   POTASSIUM mmol/L 4.4 4.4 4.2   CHLORIDE mmol/L 107 106 104   CO2 mmol/L 26.3 23.7 25.0   BUN mg/dL 12 11 11   CREATININE mg/dL 0.69 0.79 0.91   CALCIUM mg/dL 7.9* 8.4* 8.9   MAGNESIUM mg/dL 1.9 2.1 2.2   PHOSPHORUS mg/dL 3.9 4.2 2.8   CrCl cannot be calculated (Unknown ideal weight.).      Assessment & Plan   Active Hospital Problems    Diagnosis  POA    **COVID-19 [U07.1]  Yes    Metabolic encephalopathy [G93.41]  Yes    Fall [W19.XXXA]  Yes    Anemia [D64.9]  Unknown    Rheumatoid arthritis [M06.9]  Yes    Seizures [R56.9]  Yes    Lymphedema [I89.0]  Yes      Resolved Hospital Problems   No resolved problems to display.       PLAN  Patient is a 74-year-old female with a history of seizures, HLD, rheumatoid arthritis, who presented to the ED with weakness, fall, and altered mental status and is found to have COVID-19 and possible urinary tract infection  -Appreciate neurology's evaluation.  Continue her current outpatient dosing with Keppra and Depakote.  Presentation not consistent with seizure or status.  -This is likely all metabolic related to underlying infectious processes including COVID-19 and urinary tract infection.  -Her urine cultures growing gram-positive cocci.  Continue to follow the sensitivities she seems to be responding to Rocephin which does raise the question whether this is a true infection or not.  Remains afebrile  -She remains on Paxlovid here in the hospital for her COVID-19 infection continue supportive care.  No need for steroids or supplemental oxygen at this time.    -Ambulated with physical therapy today.  Plan Home with home health versus skilled nursing facility depending on ongoing progress.  Will follow-up discharge planning's evaluation  -Will add subcu heparin for DVT prophylaxis  -Full code    Disposition  Expected Discharge Date: 2/11/2025; Expected Discharge Time:    SNF vs HH based on progress medically stable to DC tomorrow    José Luis Walter  MD [facility]  02/08/25  12:27 EST

## 2025-02-08 NOTE — PLAN OF CARE
Goal Outcome Evaluation:  Plan of Care Reviewed With: patient           Outcome Evaluation: Pt. Agreeable to PT this date. Required minAx1 for STS, and CGA for ambulation for 15' with rwx. Pt. Demonstrated slow but steady gait speed with postural lean to R. Performed seated marches, LAQ, HR/TR x10 each. Anticipate D/C home with HH PT when appropriate. Pt. Remains appropriate for skilled PT acutely.    Anticipated Discharge Disposition (PT): home with home health

## 2025-02-09 VITALS
DIASTOLIC BLOOD PRESSURE: 65 MMHG | WEIGHT: 207.4 LBS | HEART RATE: 82 BPM | TEMPERATURE: 98.6 F | BODY MASS INDEX: 37.93 KG/M2 | OXYGEN SATURATION: 94 % | SYSTOLIC BLOOD PRESSURE: 131 MMHG | RESPIRATION RATE: 20 BRPM

## 2025-02-09 PROBLEM — D89.831 CYTOKINE RELEASE SYNDROME, GRADE 1: Status: ACTIVE | Noted: 2025-02-09

## 2025-02-09 LAB
ALBUMIN SERPL-MCNC: 2.3 G/DL (ref 3.5–5.2)
ANION GAP SERPL CALCULATED.3IONS-SCNC: 5.7 MMOL/L (ref 5–15)
BUN SERPL-MCNC: 10 MG/DL (ref 8–23)
BUN/CREAT SERPL: 14.9 (ref 7–25)
CALCIUM SPEC-SCNC: 8.2 MG/DL (ref 8.6–10.5)
CHLORIDE SERPL-SCNC: 107 MMOL/L (ref 98–107)
CO2 SERPL-SCNC: 28.3 MMOL/L (ref 22–29)
CREAT SERPL-MCNC: 0.67 MG/DL (ref 0.57–1)
EGFRCR SERPLBLD CKD-EPI 2021: 90.7 ML/MIN/1.73
GLUCOSE SERPL-MCNC: 68 MG/DL (ref 65–99)
MAGNESIUM SERPL-MCNC: 1.8 MG/DL (ref 1.6–2.4)
PHOSPHATE SERPL-MCNC: 3.7 MG/DL (ref 2.5–4.5)
POTASSIUM SERPL-SCNC: 4.4 MMOL/L (ref 3.5–5.2)
SODIUM SERPL-SCNC: 141 MMOL/L (ref 136–145)

## 2025-02-09 PROCEDURE — 63710000001 PREDNISONE PER 5 MG: Performed by: STUDENT IN AN ORGANIZED HEALTH CARE EDUCATION/TRAINING PROGRAM

## 2025-02-09 PROCEDURE — 80069 RENAL FUNCTION PANEL: CPT | Performed by: HOSPITALIST

## 2025-02-09 PROCEDURE — 83735 ASSAY OF MAGNESIUM: CPT | Performed by: HOSPITALIST

## 2025-02-09 RX ORDER — AMOXICILLIN 875 MG/1
875 TABLET, COATED ORAL EVERY 12 HOURS SCHEDULED
Start: 2025-02-09 | End: 2025-02-14

## 2025-02-09 RX ORDER — VALPROIC ACID 250 MG/1
500 CAPSULE ORAL EVERY 12 HOURS SCHEDULED
Qty: 60 CAPSULE | Refills: 0 | Status: SHIPPED | OUTPATIENT
Start: 2025-02-09 | End: 2025-02-09

## 2025-02-09 RX ORDER — VALPROIC ACID 250 MG/1
500 CAPSULE ORAL EVERY 12 HOURS SCHEDULED
Qty: 60 CAPSULE | Refills: 0 | Status: SHIPPED | OUTPATIENT
Start: 2025-02-09

## 2025-02-09 RX ORDER — AMOXICILLIN 875 MG/1
875 TABLET, COATED ORAL EVERY 12 HOURS SCHEDULED
Status: DISCONTINUED | OUTPATIENT
Start: 2025-02-09 | End: 2025-02-09 | Stop reason: HOSPADM

## 2025-02-09 RX ORDER — AMOXICILLIN 875 MG/1
875 TABLET, COATED ORAL EVERY 12 HOURS SCHEDULED
Qty: 10 TABLET | Refills: 0 | Status: SHIPPED | OUTPATIENT
Start: 2025-02-09 | End: 2025-02-09

## 2025-02-09 RX ADMIN — Medication 1000 UNITS: at 08:21

## 2025-02-09 RX ADMIN — NIRMATRELVIR AND RITONAVIR 3 TABLET: KIT at 08:21

## 2025-02-09 RX ADMIN — PREDNISONE 1 MG: 1 TABLET ORAL at 08:21

## 2025-02-09 RX ADMIN — FOLIC ACID 1 MG: 1 TABLET ORAL at 08:21

## 2025-02-09 RX ADMIN — LEVETIRACETAM 1000 MG: 500 TABLET, FILM COATED ORAL at 08:21

## 2025-02-09 RX ADMIN — VALPROIC ACID 500 MG: 250 CAPSULE, LIQUID FILLED ORAL at 08:21

## 2025-02-09 RX ADMIN — AMOXICILLIN 875 MG: 875 TABLET, FILM COATED ORAL at 08:22

## 2025-02-09 NOTE — DISCHARGE SUMMARY
Patient Name: Tianna Walker  : 1950  MRN: [id number]    Date of Admission: 2025  Date of Discharge:  2025  Primary Care Physician: Tony Chanel APRN      Chief Complaint:   Neurologic Problem      Discharge Diagnoses     Active Hospital Problems    Diagnosis  POA    **COVID-19 [U07.1]  Yes    Cytokine release syndrome, grade 1 [D89.831]  No    Metabolic encephalopathy [G93.41]  Yes    Fall [W19.XXXA]  Yes    Anemia [D64.9]  Unknown    Rheumatoid arthritis [M06.9]  Yes    Seizures [R56.9]  Yes    Lymphedema [I89.0]  Yes      Resolved Hospital Problems   No resolved problems to display.        Hospital Course     Ms. Walker is a 74 y.o. female with a history of rheumatoid arthritis, seizures, lymphedema, impaired mobility and mild cognitive deficit who presented to  initially complaining of neurologic issues.  Please see the admitting history and physical for further details.  She was found to have metabolic encephalopathy secondary to COVID-19 and was admitted to the hospital for further evaluation and treatment.  She has somewhat of a complicated recent history.  She presented here with weakness and falls.  She had 1 fall with a head injury where she fell forward off her walker and was reportedly confused following.  In the emergency room she was unable to provide much history.  She was recently at [facility] with a urinary tract infection and anemia.  Her hemoglobin did drop to 6 at that time and did receive some blood transfusions.  She did undergone an EGD with no acute findings and it was felt that her methotrexate and medications for rheumatoid could be contributing to underlying anemia of chronic disease.  Methotrexate was discontinued and recommended outpatient follow-up with hematology.  Her urine cultures at that time grew E. coli and was treated with ceftriaxone.  She completed her Keflex in the outpatient setting.  On this admission she also had  evidence of urinary tract infection.  She grew Enterococcus here and was initially started on Rocephin with some improvement but was transitioned to amoxicillin.  Would recommend continuing treatment for this.  This does raise the question whether this is chronic colonization given the frequent issues.  Could consider outpatient urology referral for frequent urinary tract infections.  She also tested positive for COVID-19 here and was started on Paxlovid.  She was not hypoxic and did not require steroids.  Given her altered mental status and odd presentation neurology did evaluate the patient here.  They did not feel her symptoms were consistent with seizure and recommended continuing her home antiepileptic medications.  The plans been discussed with the patient at the bedside she is knowledged understanding she is worked with physical therapy and Occupational Therapy and the plan is to go home with home health today.    Day of Discharge     Subjective:  Slept well overnight without issues or concerns.  Worked well with PT yesterday.  They cleared her to go home with home health therapy.    Physical Exam:  Temp:  [97.3 °F (36.3 °C)-98.2 °F (36.8 °C)] 97.5 °F (36.4 °C)  Heart Rate:  [69-81] 80  Resp:  [18-20] 20  BP: (100-130)/(50-63) 130/55  Body mass index is 37.93 kg/m².  Physical Exam  Vitals and nursing note reviewed.   Constitutional:       General: She is not in acute distress.     Appearance: She is obese. She is ill-appearing.   Cardiovascular:      Rate and Rhythm: Normal rate and regular rhythm.   Pulmonary:      Effort: Pulmonary effort is normal. No respiratory distress.      Breath sounds: No wheezing.   Abdominal:      General: Bowel sounds are normal. There is no distension.      Palpations: Abdomen is soft.   Neurological:      Mental Status: She is oriented to person, place, and time. Mental status is at baseline.         Consultants     Consult Orders (all) (From admission, onward)       Start      Ordered    02/06/25 0702  Inpatient Neurology Consult General  IN AM        Specialty:  Neurology  Provider:  Nael Jain MD    02/06/25 0213    02/06/25 0432  Inpatient Consult to Advance Care Planning  Once        Provider:  (Not yet assigned)    02/06/25 0431    02/06/25 0025  [facility] (on-call MD unless specified) Details  Once        Specialty:  Hospitalist  Provider:  (Not yet assigned)    02/06/25 0024                  Procedures     * Surgery not found *    Imaging Results (All)       Procedure Component Value Units Date/Time    CT Head Without Contrast [[id number]] Collected: 02/05/25 2325     Updated: 02/05/25 2330    Narrative:      CT OF THE HEAD WITHOUT CONTRAST     HISTORY: Frequent falls and generalized weakness     COMPARISON: None available.     TECHNIQUE: Axial CT imaging was obtained through the brain. No IV  contrast was administered.     FINDINGS:  No acute intracranial hemorrhage is seen. The ventricles are normal in  size. There is no midline shift or mass effect. No calvarial fracture is  seen. Mucosal thickening is noted within the ethmoid sinuses. There are  asymmetric degenerative changes of the right TMJ. There is some trace  fluid within the right mastoid air cells.       Impression:      No acute intracranial findings.     Radiation dose reduction techniques were utilized, including automated  exposure control and exposure modulation based on body size.        This report was finalized on 2/5/2025 11:27 PM by Dr. Antoinette Samayoa M.D on Workstation: [id number]       XR Chest 1 View [[id number]] Collected: 02/05/25 2302     Updated: 02/05/25 2306    Narrative:      SINGLE VIEW OF THE CHEST     HISTORY: Altered mental status     COMPARISON: January 9, 2023     FINDINGS:  Heart size is within normal limits for technique and overall diminished  lung volumes. No pneumothorax, pleural effusion, or acute infiltrate is  seen.       Impression:      No acute findings.     This  "report was finalized on 2/5/2025 11:03 PM by Dr. Antoinette Samayoa M.D on Workstation: [id number]                 Pertinent Labs     Results from last 7 days   Lab Units 02/07/25  0727 02/06/25  1636 02/06/25  1237 02/05/25  2227   WBC 10*3/mm3 8.06  --  9.39 10.20   HEMOGLOBIN g/dL 9.1* 9.3* 8.4* 11.2*   PLATELETS 10*3/mm3 168  --  177 260     Results from last 7 days   Lab Units 02/09/25  0402 02/07/25  0727 02/06/25  0728 02/05/25  2227   SODIUM mmol/L 141 138 138 140   POTASSIUM mmol/L 4.4 4.4 4.4 4.2   CHLORIDE mmol/L 107 107 106 104   CO2 mmol/L 28.3 26.3 23.7 25.0   BUN mg/dL 10 12 11 11   CREATININE mg/dL 0.67 0.69 0.79 0.91   GLUCOSE mg/dL 68 82 120* 182*   EGFR mL/min/1.73 90.7 90.1 77.6 65.5     Results from last 7 days   Lab Units 02/09/25  0402 02/07/25  0727 02/06/25  0728 02/05/25  2227   ALBUMIN g/dL 2.3* 2.2* 2.7* 3.0*   BILIRUBIN mg/dL  --  0.2 0.4 0.4   ALK PHOS U/L  --  67 85 92   AST (SGOT) U/L  --  28 31 31   ALT (SGPT) U/L  --  9 8 13     Results from last 7 days   Lab Units 02/09/25  0402 02/07/25  0727 02/06/25  0728 02/05/25  2227   CALCIUM mg/dL 8.2* 7.9* 8.4* 8.9   ALBUMIN g/dL 2.3* 2.2* 2.7* 3.0*   MAGNESIUM mg/dL 1.8 1.9 2.1 2.2   PHOSPHORUS mg/dL 3.7 3.9 4.2 2.8     Results from last 7 days   Lab Units 02/05/25  2227   LIPASE U/L 11*     Results from last 7 days   Lab Units 02/05/25  2346 02/05/25  2227   HSTROP T ng/L 18* 18*   PROBNP pg/mL  --  693.0           Invalid input(s): \"LDLCALC\"  Results from last 7 days   Lab Units 02/05/25  2342   URINECX  >100,000 CFU/mL Enterococcus faecalis*     Results from last 7 days   Lab Units 02/05/25  2230   COVID19  Detected*       Test Results Pending at Discharge     Pending Results       None              Discharge Details        Discharge Medications        New Medications        Instructions Start Date   amoxicillin 875 MG tablet  Commonly known as: AMOXIL   875 mg, Oral, Every 12 Hours Scheduled      Nirmatrelvir & Ritonavir " (300mg/100mg)  Commonly known as: PAXLOVID   3 tablets, Oral, 2 Times Daily      valproic acid 250 MG capsule  Commonly known as: DEPAKENE   500 mg, Oral, Every 12 Hours Scheduled             Continue These Medications        Instructions Start Date   albuterol sulfate  (90 Base) MCG/ACT inhaler  Commonly known as: PROVENTIL HFA;VENTOLIN HFA;PROAIR HFA   2 puffs, Inhalation, Every 4 Hours PRN      benzonatate 100 MG capsule  Commonly known as: Tessalon Perles   100 mg, Oral, 3 Times Daily PRN      BIOTIN PO   Take  by mouth.      denosumab 60 MG/ML solution syringe  Commonly known as: PROLIA   Subcutaneous      folic acid 1 MG tablet  Commonly known as: FOLVITE   No dose, route, or frequency recorded.      ICAPS AREDS 2 PO   Take  by mouth.      levETIRAcetam 500 MG tablet  Commonly known as: KEPPRA   1,000 mg, 2 Times Daily      predniSONE 1 MG tablet  Commonly known as: DELTASONE   1 mg, Daily      triamcinolone 0.1 % ointment  Commonly known as: KENALOG   1 application , Topical, 2 Times Daily      Vitamin D3 50 MCG (2000 UT) tablet   Take  by mouth.             Stop These Medications      CALCIUM + D PO     clonazePAM 0.5 MG tablet  Commonly known as: KlonoPIN     dextromethorphan 15 MG/5ML syrup     guaiFENesin 600 MG 12 hr tablet  Commonly known as: Mucinex     methotrexate 2.5 MG tablet              Allergies   Allergen Reactions    Aspirin GI Intolerance    Oxcarbazepine Swelling    Topamax [Topiramate] Itching    Celecoxib GI Intolerance    Codeine GI Intolerance    Diclofenac GI Intolerance    Fluticasone GI Intolerance    Lamictal [Lamotrigine] GI Intolerance    Metaxalone GI Intolerance       Discharge Disposition:  Home-Health Care [facility]      Discharge Diet:  Diet Order   Procedures    Diet: Cardiac; Healthy Heart (2-3 Na+); Fluid Consistency: Thin (IDDSI 0)       Discharge Activity:   Activity Instructions       Activity as Tolerated              CODE STATUS:    Code Status and Medical  Interventions: CPR (Attempt to Resuscitate); Full   Ordered at: 02/06/25 0213     Code Status (Patient has no pulse and is not breathing):    CPR (Attempt to Resuscitate)     Medical Interventions (Patient has pulse or is breathing):    Full       No future appointments.  Additional Instructions for the Follow-ups that You Need to Schedule       Ambulatory Referral to Home Health   As directed      Face to Face Visit Date: 2/9/2025   Follow-up provider for Plan of Care?: I treated the patient in an acute care facility and will not continue treatment after discharge.   Follow-up provider: ASHLY BINGHAM [[id number]]   Reason/Clinical Findings: weakness and gait instbility after UTI and Covid   Describe mobility limitations that make leaving home difficult: doesnt drive   Nursing/Therapeutic Services Requested: Physical Therapy   Frequency: 1 Week 1        Discharge Follow-up with PCP   As directed       Currently Documented PCP:    Ashly Bingham APRN    PCP Phone Number:    [phone]     Follow Up Details: 2 weeks               Follow-up Information       Ashly Bingham APRN .    Specialty: Nurse Practitioner  Why: 2 weeks  Contact information:  [address] [place] [address]  [phone]                             Additional Instructions for the Follow-ups that You Need to Schedule       Ambulatory Referral to Home Health   As directed      Face to Face Visit Date: 2/9/2025   Follow-up provider for Plan of Care?: I treated the patient in an acute care facility and will not continue treatment after discharge.   Follow-up provider: ASHLY BINGHAM [[id number]]   Reason/Clinical Findings: weakness and gait instbility after UTI and Covid   Describe mobility limitations that make leaving home difficult: doesnt drive   Nursing/Therapeutic Services Requested: Physical Therapy   Frequency: 1 Week 1        Discharge Follow-up with PCP   As directed       Currently Documented PCP:     Tony Chanel, APRN    PCP Phone Number:    [phone]     Follow Up Details: 2 weeks            Time Spent on Discharge:  Greater than 30 minutes      José Luis Walter MD  [facility]  02/09/25  06:11 EST

## 2025-02-09 NOTE — DISCHARGE PLACEMENT REQUEST
Mehreen Dooley (74 y.o. Female)       Date of Birth   1950    Social Security Number       Address   [address] [place] [address]    Home Phone   [phone]    MRN   [id number]       Rastafarian   Synagogue    Marital Status                               Admission Date   2/5/25    Admission Type   Emergency    Admitting Provider   Eleazar Martinez MD    Attending Provider   José Luis Walter MD    Department, Room/Bed   [address], P590/1       Discharge Date       Discharge Disposition   [facility]    Discharge Destination                                 Attending Provider: José Luis Walter MD    Allergies: Aspirin, Oxcarbazepine, Topamax [Topiramate], Celecoxib, Codeine, Diclofenac, Fluticasone, Lamictal [Lamotrigine], Metaxalone    Isolation: Enh Drop/Con   Infection: COVID (confirmed) (02/06/25)   Code Status: CPR    Ht: --   Wt: 94.1 kg (207 lb 6.4 oz)    Admission Cmt: None   Principal Problem: COVID-19 [U07.1]                   Active Insurance as of 2/5/2025       Primary Coverage       Payor Plan Insurance Group Employer/Plan Group    MEDICARE MEDICARE A & B        Payor Plan Address Payor Plan Phone Number Payor Plan Fax Number Effective Dates    [address] [phone]  6/1/2013 - None Entered    [facility] [id number]         Subscriber Name Subscriber Birth Date Member ID       MEHREEN DOOLEY 1950 [id number]               Secondary Coverage       Payor Plan Insurance Group Employer/Plan Group    [facility] SUPP [id number]       Payor Plan Address Payor Plan Phone Number Payor Plan Fax Number Effective Dates    [address]   12/1/2016 - None Entered    [facility] [address]         Subscriber Name Subscriber Birth Date Member ID       MEHREEN DOOLEY 1950 [id number]                     Emergency Contacts        (Rel.) Home Phone Work Phone Mobile Phone    KATHRIN DOOLEY (Daughter)  [phone] -- --

## 2025-02-09 NOTE — CASE MANAGEMENT/SOCIAL WORK
Continued Stay Note  [facility]     Patient Name: Tianna Walker  MRN: [id number]  Today's Date: 2/9/2025    Admit Date: 2/5/2025    Plan: Home w/ [facility]   Discharge Plan       Row Name 02/09/25 0857       Plan    Plan Home w/ [facility]    Plan Comments CCP notified that pt is discharging today and needs home health set up. After reviewing the chart pt had [facility] in the past and pt reports she would like to go back with [facility]. Referral placed and spoke with Antoinette/[facility]- juanjo. RN and pt updated.                   Discharge Codes    No documentation.                 Expected Discharge Date and Time       Expected Discharge Date Expected Discharge Time    Feb 9, 2025               Belkis Millan RN

## 2025-02-10 ENCOUNTER — READMISSION MANAGEMENT (OUTPATIENT)
Dept: CALL CENTER | Facility: HOSPITAL | Age: 75
End: 2025-02-10
Payer: COMMERCIAL

## 2025-02-10 NOTE — CASE MANAGEMENT/SOCIAL WORK
Case Management Discharge Note      Final Note: Home with [facility] per private auto         Selected Continued Care - Discharged on 2/9/2025 Admission date: 2/5/2025 - Discharge disposition: Home-Health Care Svc      Destination    No services have been selected for the patient.                Durable Medical Equipment    No services have been selected for the patient.                Dialysis/Infusion    No services have been selected for the patient.                Home Medical Care Coordination complete.      Service Provider Services Address Phone Fax Patient Preferred    [facility] - [facility] [address]  [place] [address] [phone] --              Therapy    No services have been selected for the patient.                Community Resources    No services have been selected for the patient.                Community & DME    No services have been selected for the patient.                    Transportation Services  Private: Car    Final Discharge Disposition Code: 06 - home with home health care

## 2025-02-10 NOTE — OUTREACH NOTE
Prep Survey      Flowsheet Row Responses   [facility] facility patient discharged from? [facility]   Is LACE score < 7 ? No   Eligibility Readm Mgmt   Discharge diagnosis Covid 19   Does the patient have one of the following disease processes/diagnoses(primary or secondary)? Other   Does the patient have Home health ordered? Yes   [facility]   Is there a DME ordered? No   Medication alerts for this patient see avs   Prep survey completed? Yes            Yareli BOWEN - Registered Nurse

## 2025-02-12 ENCOUNTER — READMISSION MANAGEMENT (OUTPATIENT)
Dept: CALL CENTER | Facility: HOSPITAL | Age: 75
End: 2025-02-12
Payer: COMMERCIAL

## 2025-02-12 NOTE — OUTREACH NOTE
Medical Week 1 Survey      Flowsheet Row Responses   [facility] patient discharged from? [facility]   Does the patient have one of the following disease processes/diagnoses(primary or secondary)? Other   Week 1 attempt successful? Yes   Call start time 1151   Call end time 1153   Discharge diagnosis Covid 19   Person spoke with today (if not patient) and relationship Daughter   Meds reviewed with patient/caregiver? Yes   Is the patient having any side effects they believe may be caused by any medication additions or changes? No   Does the patient have all medications ordered at discharge? Yes   Is the patient taking all medications as directed (includes completed medication regime)? Yes   Does the patient have a primary care provider?  Yes   Does the patient have an appointment with their PCP within 7 days of discharge? No   What is preventing the patient from scheduling follow up appointments within 7 days of discharge? Haven't had time   Nursing Interventions Advised patient to make appointment   Has the patient kept scheduled appointments due by today? N/A   What is the Home health agency?  [facility]   Has home health visited the patient within 72 hours of discharge? Yes   Psychosocial issues? No   Did the patient receive a copy of their discharge instructions? Yes   Nursing interventions Reviewed instructions with patient   What is the patient's perception of their health status since discharge? Improving   Week 1 call completed? Yes   Graduated Yes   Wrap up additional comments Pt doing well and is going to call pharmacy to see if med is ready for .   Call end time 1153            Mirella NOLASCO - Registered Nurse   danger to self; mental illness expected to respond to inpatient care danger to self; mental illness expected to respond to inpatient care danger to self; mental illness expected to respond to inpatient care danger to self; mental illness expected to respond to inpatient care danger to self; mental illness expected to respond to inpatient care danger to self; mental illness expected to respond to inpatient care danger to self; mental illness expected to respond to inpatient care